# Patient Record
Sex: MALE | Race: WHITE
[De-identification: names, ages, dates, MRNs, and addresses within clinical notes are randomized per-mention and may not be internally consistent; named-entity substitution may affect disease eponyms.]

---

## 2021-10-27 ENCOUNTER — HOSPITAL ENCOUNTER (OUTPATIENT)
Dept: HOSPITAL 53 - M LAB REF | Age: 71
End: 2021-10-27
Attending: OTOLARYNGOLOGY
Payer: MEDICARE

## 2021-10-27 DIAGNOSIS — K13.21: Primary | ICD-10-CM

## 2021-10-28 ENCOUNTER — HOSPITAL ENCOUNTER (OUTPATIENT)
Dept: HOSPITAL 53 - M LAB | Age: 71
End: 2021-10-28
Attending: OTOLARYNGOLOGY
Payer: MEDICARE

## 2021-10-28 DIAGNOSIS — Z01.812: Primary | ICD-10-CM

## 2021-10-28 LAB
BUN SERPL-MCNC: 16 MG/DL (ref 7–18)
CREAT SERPL-MCNC: 1 MG/DL (ref 0.7–1.3)
GFR SERPL CREATININE-BSD FRML MDRD: > 60 ML/MIN/{1.73_M2} (ref 42–?)

## 2021-11-03 ENCOUNTER — HOSPITAL ENCOUNTER (OUTPATIENT)
Dept: HOSPITAL 53 - M RAD | Age: 71
End: 2021-11-03
Attending: OTOLARYNGOLOGY
Payer: MEDICARE

## 2021-11-03 DIAGNOSIS — K13.21: Primary | ICD-10-CM

## 2021-11-03 PROCEDURE — 70491 CT SOFT TISSUE NECK W/DYE: CPT

## 2021-11-03 NOTE — REPVR
PROCEDURE INFORMATION: 

Exam: CT Neck With Contrast 

Exam date and time: 11/3/2021 11:31 AM 

Age: 71 years old 

Clinical indication: Other: Leukoplakia of oral mucosa, including tongue 



TECHNIQUE: 

Imaging protocol: Computed tomography images of the neck with contrast. 

Radiation optimization: All CT scans at this facility use at least one of these 

dose optimization techniques: automated exposure control; mA and/or kV 

adjustment per patient size (includes targeted exams where dose is matched to 

clinical indication); or iterative reconstruction. 

Contrast material: ISOVUE 370; Contrast volume: 75 ml; Contrast route: 

INTRAVENOUS (IV);  



COMPARISON: 

MRI ORBIT FACE NECK W/O FOLL W 2/8/2016 5:57 PM 



FINDINGS: 

Paranasal sinuses: Mild mucosal thickening is present in the maxillary sinuses. 

Nasopharynx: Unremarkable. 

Oropharynx: Unremarkable. No significant tonsillar enlargement. 

Hypopharynx: Unremarkable. 

Larynx: Unremarkable. Normal epiglottis. 

Retropharyngeal space: Unremarkable. 

Submandibular/Parotid glands: Normal. Glands are normal in size. 

Thyroid: Normal. No enlarged or calcified nodules.  

Lymph nodes: Unremarkable. No lymphadenopathy. 



Trachea: Visualized trachea is unremarkable. 

Lungs: Unremarkable as visualized. 

Bones/joints: Moderate/severe degenerative changes of the cervical spine are 

present. There is moderate/severe spinal canal stenosis and severe bilateral 

neural foraminal narrowing at C4-C5, C5-C6, and C6-C7 due to posterior disc 

osteophyte complexes and uncinate spurring. 

Soft tissues: Unremarkable. No significant soft tissue swelling. 



IMPRESSION: 

No acute abnormality. 



Electronically signed by: Romero Alonso On 11/03/2021  12:07:52 PM

## 2021-11-17 ENCOUNTER — HOSPITAL ENCOUNTER (OUTPATIENT)
Dept: HOSPITAL 53 - M LABSMTC | Age: 71
End: 2021-11-17
Attending: ANESTHESIOLOGY
Payer: MEDICARE

## 2021-11-17 DIAGNOSIS — Z11.52: ICD-10-CM

## 2021-11-17 DIAGNOSIS — Z01.818: Primary | ICD-10-CM

## 2021-11-22 ENCOUNTER — HOSPITAL ENCOUNTER (OUTPATIENT)
Dept: HOSPITAL 53 - M OPP | Age: 71
Discharge: HOME | End: 2021-11-22
Attending: INTERNAL MEDICINE
Payer: MEDICARE

## 2021-11-22 VITALS — HEIGHT: 70 IN | BODY MASS INDEX: 29.78 KG/M2 | WEIGHT: 208 LBS

## 2021-11-22 VITALS — DIASTOLIC BLOOD PRESSURE: 67 MMHG | SYSTOLIC BLOOD PRESSURE: 116 MMHG

## 2021-11-22 DIAGNOSIS — E78.5: ICD-10-CM

## 2021-11-22 DIAGNOSIS — Z86.010: ICD-10-CM

## 2021-11-22 DIAGNOSIS — Z12.11: Primary | ICD-10-CM

## 2021-11-22 DIAGNOSIS — Z95.5: ICD-10-CM

## 2021-11-22 DIAGNOSIS — I10: ICD-10-CM

## 2021-11-22 DIAGNOSIS — K64.0: ICD-10-CM

## 2021-11-22 DIAGNOSIS — Z79.82: ICD-10-CM

## 2021-11-22 DIAGNOSIS — Z79.899: ICD-10-CM

## 2021-11-22 DIAGNOSIS — Z88.0: ICD-10-CM

## 2021-11-22 DIAGNOSIS — K57.30: ICD-10-CM

## 2021-11-22 NOTE — CCD
Continuity of Care Document (CCD)

                             Created on: 10/13/2021



Gary Foreman JR

External Reference #: [id number]

: 1950

Sex: Male



Demographics





                          Address                   [address]

[place]  [address]

 

                          Home Phone                [phone]

 

                          Preferred Language        Unknown

 

                          Marital Status            Unknown

 

                          Tenriism Affiliation     Unknown

 

                          Race                      White

 

                          Ethnic Group              Declined to Specify/Unknown





Author





                          Author                    Gary JORDAN MD

 

                          Organization              Unknown

 

                          Address                   [address]

[place]  [address]



 

                          Phone                     [phone]







Care Team Providers





                    Care Team Member Name Role                Phone

 

                    Joce Catalan SHAW   AUTM                [phone]

 

                    Julian Sigala M.D.   AUTM                [phone]







Problems





                    Active Problems     Provider            Date

 

                    Allergic asthma without status asthmaticus BRENDAN Reyes Onset: 

2014

 

                    Basal Cell Carcinoma Skin Lower Limb Including Hip Ravi bradford M.D. Onset: 

2014

 

                    Leukoplakia of oral mucosa Herson Jordan MD     Onset: 2016







Social History





                Type            Date            Description     Comments

 

                Birth Sex                       Unknown          

 

                ETOH Use                        2-3 A Week       

 

                Tobacco Use     Start: Unknown  Patient has never smoked  

 

                Recreational Drug Use                 Denies Drug Use  







Allergies and adverse reactions





             Active Allergies Criticality  Reaction | Severity Comments     Date

 

             Penicillin   Unable to assess criticality hives                    

 2014







Medications





           Active Medications SIG        Qnty       Indications Ordering Provide

r Date

 

                          Advair Diskus                     250-50mcg/Dose Aeros

ol                   1 

puff bid. rinse mouth after using 60units                         Unknown       

  

 

                    Atorvastatin Calcium                     40mg Tablets       

            1 po qd             

                                        Unknown             

 

             Clopidogrel Bisulfate                     75mg Tablets             

                                             

Unknown                                 

 

                    Aspirin                     81mg Tablets                   1

 by mouth every day 

                                        Unknown             

 

                          Proair HFA                     108(90Base) mcg/Act Aer

osol                   2 

puffs four times a day as needed                                 Unknown        

 

 

           Nitroglycerin                                  Unknown    

 

                          Omeprazole                     20mg Capsules DR       

            Take One 

Capsule By Mouth Daily                                 Unknown         

0

 

                          Nystatin                     [id number]/ML Suspension 

                  5ml 

swish in the mouth several minuts before swallowing four times a day for 7 days 

                                        Unknown             







Immunizations





                                        Description

 

                                        No Information Available







Vital Signs





                Date            Vital           Result          Comment

 

                10/13/2021  8:12am Height          70 inches       5'10"

 

                    Weight              220.00 lb            

 

                    BMI (Body Mass Index) 31.6 kg/m2           

 

                    Ideal Body Weight   166 lb               

 

                    Weight              99.792 kg            

 

                    BSA (Body Surface Area) 2.17 m2              

 

                2021  8:21am Height          70 inches       5'10"

 

                    Weight              210.00 lb            

 

                    BMI (Body Mass Index) 30.1 kg/m2           

 

                    Ideal Body Weight   166 lb               

 

                    Weight              95.256 kg            

 

                    BSA (Body Surface Area) 2.13 m2              







Results





                                        Description

 

                                        No Information Available







Procedures





                Date            Code            Description     Status

 

                2021      06983           Office/Outpatient Established 

 MDM 10-19 Min Completed







Medical Devices





                                        Description

 

                                        No Information Available







Encounters





           Type       Date       Location   Provider   Dx         Diagnosis

 

           Office Visit 2021  8:15a [facility] Herson Jordan MD

 K13.21     

Leukoplakia of oral mucosa, including tongue







Assessments





                Date            Code            Description     Provider

 

                10/13/2021      K13.21          Leukoplakia of oral mucosa, incl

uding tongue Herson Jordan MD

 

                2021      K13.21          Leukoplakia of oral mucosa, incl

uding tongue Herson Jordan MD







Plan of Treatment

Future Appointment(s):* 10/27/2021  7:00 am - Herson Jordan MD at [facility]

* 2021  8:00 am - Herson Jordan MD at [facility]

10/13/2021 - Herson Jordan MD* K13.21 Leukoplakia of oral mucosa, including 
  tongue* Follow up:* 2 weeks, 30-minute procedure slow









Functional Status





                                        Description

 

                                        No Information Available







Mental Status





                                        Description

 

                                        No Information Available







Referrals





                                        Description

 

                                        No Information Available

## 2021-11-22 NOTE — ROOR
________________________________________________________________________________

Patient Name: Gary Foreman          Procedure Date: 11/22/2021 10:48 AM

MRN: [id number]                          Account Number: [id number]

YOB: 1950               Age: 71

Room: [facility]                            Gender: Male

Note Status: Finalized                 

________________________________________________________________________________

 

Procedure:            Total Colonoscopy to Cecum + ileoscopy

Indications:          High risk colon cancer surveillance: Personal history of 

                      colonic polyps, Last colonoscopy: 2016

Providers:            West Campbell MD

Referring MD:         Julian Sigala MD

Requesting Provider:  

Medicines:            Monitored Anesthesia Care

Complications:        No immediate complications.

________________________________________________________________________________

Procedure:            Pre-Anesthesia Assessment:

                      - The heart rate, respiratory rate, oxygen saturations, 

                      blood pressure, adequacy of pulmonary ventilation, and 

                      response to care were monitored throughout the procedure.

                      The Colonoscope was introduced through the anus and 

                      advanced to the cecum, identified by appendiceal orifice 

                      and ileocecal valve. The colonoscopy was performed 

                      without difficulty. The patient tolerated the procedure 

                      well. The quality of the bowel preparation was excellent.

                                                                                

Findings:

     The perianal and digital rectal examinations were normal.

     Non-bleeding internal hemorrhoids were found during retroflexion. The 

     hemorrhoids were small and Grade I (internal hemorrhoids that do not 

     prolapse).

     Multiple small and large-mouthed diverticula were found in the 

     recto-sigmoid colon, sigmoid colon and descending colon.

     The exam was otherwise without abnormality on direct and retroflexion 

     views.

     The terminal ileum appeared normal.

                                                                                

Impression:           - Non-bleeding internal hemorrhoids.

                      - Diverticulosis in the recto-sigmoid colon, in the 

                      sigmoid colon and in the descending colon.

                      - The examination was otherwise normal on direct and 

                      retroflexion views.

                      - The examined portion of the ileum was normal.

                      - No specimens collected.

                      - The exam was otherwise normal to the cecum.

Recommendation:       - Patient has a contact number available for 

                      emergencies. The signs and symptoms of potential delayed 

                      complications were discussed with the patient. Return to 

                      normal activities tomorrow. Written discharge 

                      instructions were provided to the patient.

                      - High fiber diet.

                      - Discharge patient to home.

                      - Continue present medications.

                      - Repeat colonoscopy in 5 years for screening purposes.

                      - Return to referring physician.

                      - The findings and recommendations were discussed with 

                      the patient.

                                                                                

Procedure Code(s):    --- Professional ---

                      , Colorectal cancer screening; colonoscopy on 

                      individual at high risk

Diagnosis Code(s):    --- Professional ---

                      Z86.010, Personal history of colonic polyps

                      K64.0, First degree hemorrhoids

                      K57.30, Diverticulosis of large intestine without 

                      perforation or abscess without bleeding

 

CPT copyright 2019 [employer]. All rights reserved.

 

The codes documented in this report are preliminary and upon  review may 

be revised to meet current compliance requirements.

 

West Campbell MD

____________________

West Campbell MD

11/22/2021 11:09:08 AM

Electronically signed by West Campbell MD

Number of Addenda: 0

 

Note Initiated On: 11/22/2021 10:48 AM

Estimated Blood Loss: Estimated blood loss: none.

## 2021-11-22 NOTE — CCD
Continuity of Care Document (CCD)

                             Created on: 2021



Gary Foreman

External Reference #: [id number]

: 1950

Sex: Male



Demographics





                          Address                   [address] [place] [address]  [address]

 

                          Home Phone                [phone]

 

                          Preferred Language        Unknown

 

                          Marital Status            Unknown

 

                          Nondenominational Affiliation     Unknown

 

                          Race                      White

 

                          Ethnic Group              Not  or 





Author





                          Author                    Gary CAMPBELL M.D.

 

                          Organization              Unknown

 

                          Address                   [address] [place]  [address]



 

                          Phone                     [phone]







Care Team Providers





                    Care Team Member Name Role                Phone

 

                    Julian Sigala M.D. AUTM                [phone]







Problems





                    Active Problems     Provider            Date

 

                    History of polyp of colon YOAN Medley Onset: 







Social History





                Type            Date            Description     Comments

 

                Birth Sex                       Unknown          

 

                ETOH Use                        Occasionally     

 

                Tobacco Use     Start: Unknown  Patient has never smoked  







Allergies and adverse reactions





             Active Allergies Criticality  Reaction | Severity Comments     Date

 

             Penicillin   Unable to assess criticality Itch                     

 2013







Medications





           Active Medications SIG        Qnty       Indications Ordering Provide

r Date

 

                                        Suprep Bowel Prep Kit                   

  17.5-3.13-1.6GM/177ML Solution        

             use as directed 354ml                     West Campbell M.D. 

2021

 

                          Lipitor                     40mg Tablets              

     take one tablet by 

mouth every day 90tabs                          Julian Sigala M.D. 2015

 

                          Omeprazole                     20mg Capsules DR       

            1 tabs by 

mouth every day 90caps                          Unknown         

 

                Advair Diskus                     100-50mcg/Dose Aerosol        

                                            

                          Unknown                   

 

           Multiple Vitamin                      Tablets                        

                            Unknown    



 

                          Clopidogrel Bisulfate                     75mg Tablets

                   Take 

One Tablet By Mouth Every Day                                 Unknown         

 

           Aspirin                     81mg Tablets DR                          

                          Unknown    



 

                    Proair HFA                     108(90Base) mcg/Act Aerosol  

                                      

                                        Unknown             

 

           Nitrostat                     0.4mg Tablets Sub                      

                              Unknown    









Immunizations





                                        Description

 

                                        No Information Available







Vital Signs





                Date            Vital           Result          Comment

 

                2021  3:30pm Height          70 inches       5'10"

 

                    Weight              222.00 lb            

 

                    BP Systolic         117 mmHg             

 

                    BP Diastolic        84 mmHg              

 

                    Heart Rate          80 /min              

 

                    BMI (Body Mass Index) 31.9 kg/m2           

 

                    Weight              100.699 kg           

 

                    Body Temperature    97.2 F             

 

                2016 10:10am Height          70 inches       5'10"

 

                    Weight              233.00 lb            

 

                    BP Systolic         111 mmHg             

 

                    BP Diastolic        72 mmHg              

 

                    Heart Rate          70 /min              

 

                    BMI (Body Mass Index) 33.4 kg/m2           

 

                    Weight              105.689 kg           







Results





                                        Description

 

                                        No Information Available







Procedures





                Date            Code            Description     Status

 

                2021      51436           Office/Outpatient New Low MDM 30

-44 Minutes Completed







Medical Devices





                                        Description

 

                                        No Information Available







Encounters





           Type       Date       Location   Provider   Dx         Diagnosis

 

           Office Visit 2021  3:00p Main Office West Campbell M.D. Z

86.010    

Personal history of colonic polyps







Assessments





                Date            Code            Description     Provider

 

                2021      Z86.010         Personal history of colonic poly

ps West Campbell M.D.







Plan of Treatment

Future Appointment(s):* 2021  7:30 am - Bertha at Main Office

* 2021 12:45 pm - West Campbell M.D. at Main Office

2021 - West Campbell M.D.* Z86.010 Personal history of colonic 
  polyps* Comments:* 71 year old white male in no acute distress. His last 
  colonoscopy was in . Tubular adenoma polyp was biopsied. Hemorrhoids and 
  Diverticulosis were noted.He has daily bowel movements. Denies rectal 
  bleeding, abdominal pain, unexplained weight loss or a family history of colon
  cancer or colon polyps.Appetite is healthy. He denies nausea, vomiting, 
  dysphagia or GERD.    Plan:1. Colonoscopy to cecum2. Informed consent.









Functional Status





                                        Description

 

                                        No Information Available







Mental Status





                                        Description

 

                                        No Information Available







Referrals





                                        Description

 

                                        No Information Available

## 2021-11-22 NOTE — CCD
Summarization Of Episode

                             Created on: 2021



GARY CLOUD JR

External Reference #: [id number]

: 1950

Sex: Undifferentiated



Demographics





                          Address                   [address] [address]  [address]

 

                          Home Phone                [phone]

 

                          Preferred Language        Unknown

 

                          Marital Status            Unknown

 

                          Voodoo Affiliation     Unknown

 

                          Race                      Unknown

 

                          Ethnic Group              Not  or 





Author





                          Author                    [facility]

 

                          Organization              [facility]

 

                          Address                   Unknown

 

                          Phone                     Unavailable







Support





                Name            Relationship    Address         Phone

 

                RE              Next Of Kin     Unknown         Unavailable

 

                    WATNFIRE*           Next Of Kin         [address] [place]  [address]                    [phone]

 

                    ISABEL CLOUD  Next Of Kin         [address]  [address]                    [phone]

 

                    Isabel Cloud  ECON                [address]  [address]                    [phone]







Care Team Providers





                    Care Team Member Name Role                Phone

 

                    Cora Weinstein MD Unavailable         Unavailable

 

                    Cora Weinstein MD Unavailable         Unavailable

 

                    Cora Weinstein MD Unavailable         Unavailable

 

                    Cora Weinstein MD Unavailable         Unavailable

 

                    Cora Weinstein MD Unavailable         Unavailable

 

                    Cora Weinstein MD Unavailable         Unavailable

 

                    RE Campbell MD Unavailable         Unavailable

 

                    RE Campbell MD Unavailable         Unavailable

 

                    RE Campbell MD Unavailable         Unavailable

 

                    RE Campbell MD Unavailable         Unavailable

 

                    RE Campbell MD Unavailable         Unavailable

 

                    RE Campbell MD Unavailable         Unavailable

 

                    RE Campbell MD Unavailable         Unavailable

 

                    RE Campbell MD Unavailable         Unavailable

 

                    RE Campbell MD Unavailable         Unavailable

 

                    RE Campbell MD Unavailable         Unavailable

 

                    RE Campbell MD Unavailable         Unavailable

 

                    RE Campbell MD Unavailable         Unavailable

 

                    RE Campbell MD Unavailable         Unavailable

 

                    RE Campbell MD Unavailable         Unavailable

 

                    RE Campbell MD Unavailable         Unavailable

 

                    RE Campbell MD Unavailable         Unavailable

 

                    RE Campbell MD Unavailable         Unavailable

 

                    RE Campbell MD Unavailable         Unavailable

 

                    RE Campbell MD Unavailable         Unavailable

 

                    RE Campbell MD Unavailable         Unavailable

 

                    RE Campbell MD Unavailable         Unavailable

 

                    RE Campbell MD Unavailable         Unavailable

 

                    RE Campbell MD Unavailable         Unavailable

 

                    RE Campbell MD Unavailable         Unavailable

 

                    RE Campbell MD Unavailable         Unavailable

 

                    RE Campbell MD Unavailable         Unavailable

 

                    RE Campbell MD Unavailable         Unavailable

 

                    RE Campbell MD Unavailable         Unavailable

 

                    Adrian S West KING Unavailable         Unavailable

 

                    Adrian S West KING Unavailable         Unavailable

 

                    Adrian S West KING Unavailable         Unavailable

 

                    Adrian S West KING Unavailable         Unavailable

 

                    Adrian S West KING Unavailable         Unavailable

 

                    Adrian S West KING Unavailable         Unavailable

 

                    Adrian S West MD Unavailable         Unavailable

 

                    Adrian S West MD Unavailable         Unavailable

 

                    Adrian S West KING Unavailable         Unavailable

 

                    Adrian S West KING Unavailable         Unavailable

 

                    Adrian S West KING Unavailable         Unavailable

 

                    Adrian S West KING Unavailable         Unavailable

 

                    Adrian S West KING Unavailable         Unavailable

 

                    Adrian S West KING Unavailable         Unavailable

 

                    Adrian S West K[place] Unavailable         Unavailable

 

                    Adrian S West K[place] Unavailable         Unavailable

 

                    RE Campbell MD Unavailable         Unavailable

 

                    RE Campbell MD Unavailable         Unavailable

 

                    RE Campbell MD Unavailable         Unavailable

 

                    RE Campbell MD Unavailable         Unavailable

 

                    RE Campbell MD Unavailable         Unavailable

 

                    RE Campbell MD Unavailable         Unavailable

 

                    RE Campbell MD Unavailable         Unavailable

 

                    LINDA Sigala MD   Unavailable         Unavailable

 

                    LINDA Sigala MD   Unavailable         Unavailable

 

                    LINDA Sigala MD   Unavailable         Unavailable

 

                    LINDA Sigala MD   Unavailable         Unavailable

 

                    LINDA iSgala MD   Unavailable         Unavailable

 

                    LINDA Sigala MD   Unavailable         Unavailable

 

                    LINDA Sigala MD   Unavailable         Unavailable

 

                    LINDA Sigala MD   Unavailable         Unavailable

 

                    LINDA Sigala MD   Unavailable         Unavailable

 

                    LINDA Sigala MD   Unavailable         Unavailable

 

                    LINDA Sigala MD   Unavailable         Unavailable

 

                    LINDA Sigala MD   Unavailable         Unavailable

 

                    LINDA Sigala MD   Unavailable         Unavailable

 

                    LINDA Sigala MD   Unavailable         Unavailable

 

                    LINDA Sigala MD   Unavailable         Unavailable

 

                    LINDA Sigala MD   Unavailable         Unavailable

 

                    LINDA Sigala MD   Unavailable         Unavailable

 

                    LINDA Sigala MD   Unavailable         Unavailable

 

                    LINDA Sigala MD   Unavailable         Unavailable

 

                    LINDA Sigala MD   Unavailable         Unavailable

 

                    LINDA Sigala MD   Unavailable         Unavailable

 

                    LINDA Sigala MD   Unavailable         Unavailable

 

                    LINDA Sigala MD   Unavailable         Unavailable

 

                    LINDA Sigala MD   Unavailable         Unavailable

 

                    LINDA Sigala MD   Unavailable         Unavailable

 

                    LINDA Sigala MD   Unavailable         Unavailable

 

                    LINDA Sigala MD   Unavailable         Unavailable

 

                    LINDA Sigala MD   Unavailable         Unavailable

 

                    LINDA Sigala MD   Unavailable         Unavailable

 

                    LINDA Sigala MD   Unavailable         Unavailable

 

                    LINDA Sigala MD   Unavailable         Unavailable

 

                    LINDA Sigala MD   Unavailable         Unavailable

 

                    LINDA Sigala MD   Unavailable         Unavailable

 

                    LINDA Sigala MD   Unavailable         Unavailable

 

                    LINDA Sigala MD   Unavailable         Unavailable

 

                    LINDA Sigala MD   Unavailable         Unavailable

 

                    LINDA Sigala MD   Unavailable         Unavailable

 

                    LINDA Sigala MD   Unavailable         Unavailable

 

                    LINDA Sigala MD   Unavailable         Unavailable

 

                    LINDA Sigala MD   Unavailable         Unavailable

 

                    LINDA Sigala MD   Unavailable         Unavailable

 

                    LINDA Sigala MD   Unavailable         Unavailable

 

                    LINDA Sigala MD   Unavailable         Unavailable

 

                    LINDA Sigala MD   Unavailable         Unavailable

 

                    LINDA Sigala MD   Unavailable         Unavailable

 

                    LINDA Sigala MD   Unavailable         Unavailable

 

                    LINDA Sigala MD   Unavailable         Unavailable

 

                    LINDA Sigala MD   Unavailable         Unavailable

 

                    LINDA Sigala MD   Unavailable         Unavailable

 

                    LINDA Sigala MD   Unavailable         Unavailable

 

                    LINDA Sigala MD   Unavailable         Unavailable

 

                    Zakiya F Julian KING   Unavailable         Unavailable

 

                    Zakiya F Julian KING   Unavailable         Unavailable

 

                    LINDA Sigala MD   Unavailable         Unavailable

 

                    LINDA Sigala MD   Unavailable         Unavailable

 

                    LINDA Sigala MD   Unavailable         Unavailable

 

                    LINDA Sigala MD   Unavailable         Unavailable

 

                    LINDA Sigala MD   Unavailable         Unavailable

 

                    LINDA Sigala MD   Unavailable         Unavailable

 

                    LINDA Sigala MD   Unavailable         Unavailable

 

                    LINDA Sigala MD   Unavailable         Unavailable

 

                    LINDA Sigala MD   Unavailable         Unavailable

 

                    LINDA Sigala MD   Unavailable         Unavailable

 

                    LINDA Sigala MD   Unavailable         Unavailable

 

                    LINDA Sigala MD   Unavailable         Unavailable

 

                    LINDA Sigala MD   Unavailable         Unavailable

 

                    ILNDA Sigala MD   Unavailable         Unavailable

 

                    LINDA Sigala MD   Unavailable         Unavailable

 

                    LINDA Sigala MD   Unavailable         Unavailable

 

                    LINDA Sigala MD   Unavailable         Unavailable

 

                    LINDA Sigala MD   Unavailable         Unavailable

 

                    LINDA Sigala MD   Unavailable         Unavailable

 

                    LINDA Sigala MD   Unavailable         Unavailable

 

                    LINDA Sigala MD   Unavailable         Unavailable

 

                    LINDA Sigala MD   Unavailable         Unavailable

 

                    LINDA Sigala MD   Unavailable         Unavailable

 

                    LINDA Sigala MD   Unavailable         Unavailable

 

                    MCELHERAN,  MALLIKA PA Unavailable         Unavailable

 

                    MCELHERAN,  MALLIKA PA Unavailable         Unavailable

 

                    MCELHERAN,  MALLIKA PA Unavailable         Unavailable

 

                    MCELHERAN,  MALLIKA PA Unavailable         Unavailable

 

                    MCELHERAN,  MALLIKA PA Unavailable         Unavailable

 

                    MCELHERAN,  MALLIKA PA Unavailable         Unavailable

 

                    MCELHERAN,  MALLIKA PA Unavailable         Unavailable

 

                    MCELHERAN,  MALLIKA PA Unavailable         Unavailable

 

                    MCELHERAN,  MALLIKA PA Unavailable         Unavailable

 

                    MCELHERAN,  MALLIKA PA Unavailable         Unavailable

 

                    MCELHERAN,  MALLIKA PA Unavailable         Unavailable

 

                    MCELHERAN,  MALLIKA PA Unavailable         Unavailable

 

                    MCELHERAN,  MALLIKA PA Unavailable         Unavailable

 

                    MCELHERAN,  MALLIKA PA Unavailable         Unavailable

 

                    MCELHERAN,  MALLIKA PA Unavailable         Unavailable

 

                    MCELHERAN,  MALLIKA PA Unavailable         Unavailable

 

                    MCELHERAN,  MALLIKA PA Unavailable         Unavailable

 

                    MCELHERAN,  MALLIKA PA Unavailable         Unavailable

 

                    MCELHERAN,  MALLIKA PA Unavailable         Unavailable

 

                    MCELHERAN,  MALLIKA PA Unavailable         Unavailable

 

                    MCELHERAN,  MALLIKA PA Unavailable         Unavailable

 

                    MCELHERAN,  MALLIKA PA Unavailable         Unavailable

 

                    MCELHERAN,  MALLIKA PA Unavailable         Unavailable

 

                    MCELHERAN,  MALLIKA PA Unavailable         Unavailable

 

                    MCELHERAN,  MALLIKA PA Unavailable         Unavailable

 

                    MCELHERAN,  MALLIKA PA Unavailable         Unavailable

 

                    MCELHERAN,  MALLIKA PA Unavailable         Unavailable

 

                    MCELHERAN,  MALLIKA PA Unavailable         Unavailable

 

                    MCELHERAN,  MALLIKA PA Unavailable         Unavailable

 

                    Lott, N Vincent NP   Unavailable         Unavailable

 

                    Lott, N Vincent NP   Unavailable         Unavailable

 

                    Lott, N Vincent NP   Unavailable         Unavailable

 

                    Lott, N Vincent NP   Unavailable         Unavailable

 

                    Lott, N Vincent NP   Unavailable         Unavailable

 

                    Colt, N Vincent NP   Unavailable         Unavailable

 

                    Colt, N Vincent NP   Unavailable         Unavailable

 

                    Lott, N Vincent NP   Unavailable         Unavailable

 

                    Colt, N Vincent NP   Unavailable         Unavailable

 

                    Colt, N Vincent NP   Unavailable         Unavailable

 

                    Colt, N Vincent NP   Unavailable         Unavailable

 

                    Lott, N Vincent NP   Unavailable         Unavailable

 

                    Lott, N Vincent NP   Unavailable         Unavailable

 

                    Colt, N Vincent NP   Unavailable         Unavailable

 

                    Lott, N Vincent NP   Unavailable         Unavailable

 

                    Colt, N Vincent NP   Unavailable         Unavailable

 

                    Colt, N Vincent NP   Unavailable         Unavailable

 

                    Lott, N Vincent NP   Unavailable         Unavailable

 

                    Colt, N Vincent NP   Unavailable         Unavailable

 

                    Colt, N Vincent NP   Unavailable         Unavailable

 

                    Lott, N Vincent NP   Unavailable         Unavailable

 

                    Lott, N Vincent NP   Unavailable         Unavailable

 

                    Colt, N Vincent NP   Unavailable         Unavailable

 

                    Lott, N Vincent NP   Unavailable         Unavailable

 

                    Lott, N Vincent NP   Unavailable         Unavailable

 

                    Lott, N Vincent NP   Unavailable         Unavailable

 

                    Colt, N Vincent NP   Unavailable         Unavailable

 

                    Colt, N Vincent NP   Unavailable         Unavailable

 

                    Colt, N Vincent NP   Unavailable         Unavailable

 

                    Lott, N Vincent NP   Unavailable         Unavailable

 

                    Lott, N Vincent NP   Unavailable         Unavailable

 

                    Colt, N Vincent NP   Unavailable         Unavailable

 

                    Lott, N Vincent NP   Unavailable         Unavailable

 

                    Thornton, D Venkata Unavailable         Unavailable

 

                    Thornton, D Venkata Unavailable         Unavailable

 

                    Thornton, D Venkata Unavailable         Unavailable

 

                    Thornton, D Venkata Unavailable         Unavailable

 

                    Thornton, D Venkata Unavailable         Unavailable

 

                    Thornton, D Venkata Unavailable         Unavailable

 

                    Thornton, D Venkata Unavailable         Unavailable

 

                    Thornton, D Venkata Unavailable         Unavailable

 

                    Thornton, D Venkata Unavailable         Unavailable

 

                    Thornton, D Venkata Unavailable         Unavailable

 

                    Thornton, D Venkata Unavailable         Unavailable

 

                    Thornton, D Venkata Unavailable         Unavailable

 

                    Thornton, D Venkata Unavailable         Unavailable

 

                    Thornton, D Venkata Unavailable         Unavailable

 

                    Thornton, D Venkata Unavailable         Unavailable

 

                    Thornton, D Venkata Unavailable         Unavailable

 

                    Thornton, D Venkata Unavailable         Unavailable

 

                    Thornton, D Venkata Unavailable         Unavailable

 

                    Thornton, D Venkata Unavailable         Unavailable

 

                    Thornton, D Venkata Unavailable         Unavailable

 

                    Thornton, D Venkata Unavailable         Unavailable

 

                    Thornton, D Venkata Unavailable         Unavailable

 

                    Thornton, D Venkata Unavailable         Unavailable

 

                    Thornton, D Venkata Unavailable         Unavailable

 

                    Thornton, D Venkata Unavailable         Unavailable

 

                    Thornton, D Venkata Unavailable         Unavailable

 

                    Thornton, D Venkata Unavailable         Unavailable

 

                    Thornton, D Venkata Unavailable         Unavailable

 

                    Thornton, D Venkata Unavailable         Unavailable

 

                    LISBETH JORDAN MD   Unavailable         Unavailable

 

                    LISBETH JORDAN MD   Unavailable         Unavailable

 

                    LISBETH JORDAN MD   Unavailable         Unavailable

 

                    LISBETH JORDAN MD   Unavailable         Unavailable

 

                    LISBETH JORDAN MD   Unavailable         Unavailable

 

                    LISBETH JORDAN MD   Unavailable         Unavailable

 

                    LISBETH JORDAN MD   Unavailable         Unavailable

 

                    LISBETH JORDAN MD   Unavailable         Unavailable

 

                    LISBETH JORDAN MD   Unavailable         Unavailable

 

                    LISBETH JORDAN MD   Unavailable         Unavailable

 

                    LISBETH JORDAN MD   Unavailable         Unavailable

 

                    LISBETH JORDAN MD   Unavailable         Unavailable

 

                    LISBETH JORDAN MD   Unavailable         Unavailable

 

                    LISBETH JORDAN MD   Unavailable         Unavailable

 

                    LISBETH JORDAN MD   Unavailable         Unavailable

 

                    LISBETH JORDAN MD   Unavailable         Unavailable

 

                    LISBETH JORDAN MD   Unavailable         Unavailable

 

                    LISBETH JORDAN MD   Unavailable         Unavailable

 

                    LISBETH JORDAN MD   Unavailable         Unavailable

 

                    LISBETH JORDAN MD   Unavailable         Unavailable

 

                    LISBETH JORDAN MD   Unavailable         Unavailable

 

                    LISBETH JORDAN MD   Unavailable         Unavailable

 

                    LISBETH JORDAN MD   Unavailable         Unavailable

 

                    LISBETH JORDAN MD   Unavailable         Unavailable

 

                    LISBETH JORDAN MD   Unavailable         Unavailable

 

                    LISBETH JORDAN MD   Unavailable         Unavailable

 

                    LISBETH JORDAN MD   Unavailable         Unavailable

 

                    LISBETH JORDAN MD   Unavailable         Unavailable

 

                    LISBETH JORDAN MD   Unavailable         Unavailable

 

                    LISBETH JORDAN MD   Unavailable         Unavailable

 

                    LISBETH JORDAN MD   Unavailable         Unavailable

 

                    LISBETH JORDAN MD   Unavailable         Unavailable

 

                    LISBETH JORDAN MD   Unavailable         Unavailable

 

                    LISBETH JORDAN MD   Unavailable         Unavailable



                                  



Re-disclosure Warning

          The records that you are about to access may contain information from 
federally-assisted alcohol or drug abuse programs. If such information is 
present, then the following federally mandated warning applies: This information
has been disclosed to you from records protected by federal confidentiality 
rules (42 CFR part 2). The federal rules prohibit you from making any further 
disclosure of this information unless further disclosure is expressly permitted 
by the written consent of the person to whom it pertains or as otherwise 
permitted by 42 CFR part 2. A general authorization for the release of medical 
or other information is NOT sufficient for this purpose. The Federal rules 
restrict any use of the information to criminally investigate or prosecute any 
alcohol or drug abuse patient.The records that you are about to access may 
contain highly sensitive health information, the redisclosure of which is 
protected by Article 27-F of the [facility] law. If you 
continue you may have access to information: Regarding HIV / AIDS; Provided by 
facilities licensed or operated by the [facility]; 
or Provided by the [facility] for People With Developmental 
Disabilities. If such information is present, then the following [place] 
mandated warning applies: This information has been disclosed to you from 
confidential records which are protected by state law. State law prohibits you 
from making any further disclosure of this information without the specific 
written consent of the person to whom it pertains, or as otherwise permitted by 
law. Any unauthorized further disclosure in violation of state law may result in
a fine or care home sentence or both. A general authorization for the release of 
medical or other information is NOT sufficient authorization for further disc
losure.                                                                         
    



Allergies and Adverse Reactions

          



           Type       Description Substance  Reaction   Status     Data Source(s

)

 

           Propensity to adverse reactions PENICILLINS Penicillin Rash [facility]



                                                                                
       



Family History

          



             Family Member Name Family Member Gender Family Member Status Date o

f Status 

Description                             Data Source(s)

 

           Unknown    Female     Problem                          [facility] ([facility]

 

           Unknown    Unknown    Problem                          [facility] ([facility]

 

           Unknown    Male       Problem                          [facility] ([facility]

 

                                        () 

 

           Unknown    Female     Problem                          [facility] ([facility]

 

           Unknown    Female                                       



                                                                                
                                               



Encounters

          



           Encounter  Providers  Location   Date       Indications Data Source(s

)

 

                Outpatient      Admitter: Cora Weinstein MDReferrer: Cora Weinstein MD                 

2021 12:00:00 AM EDT Leukoplakia of oral mucosa, including tongue [facility]

 

                                        Leukoplakia of oral mucosa, including to

ngue 

 

                Outpatient      Attender: Vincent LIANG.BENNIE-SJP.BENNIE 

021 12:00:00 AM EDT -

2021 03:29:54 PM EDT                           [facility]

 

                Outpatient      Attender: AR Jordan/[place]/Aram/Reind

l 10/27/2021 07:00:00

AM EDT                                              [facility] ([facility]

 

                Outpatient      Attender: AR Jordan/[place]/Aram/Reind

l 10/13/2021 08:15:00

AM EDT                                              [facility] ([facility]

 

             Outpatient   Attender: West Campbell MD Main Office  2021 

03:00:00 PM EDT 

                                        [facility] ([facility])

 

                Outpatient      Attender: MALLIKA JOSE Physical Therapy 

2021 02:45:00 

PM EDT                                              [facility] ([facility]

 

                Outpatient      Attender: Julian Mckeon  08:30:00 AM EDT

                                                    [facility] ([facility]

)

 

                Outpatient      Attender: AR Jordan/Lou/Aram/Reind

l 2021 08:15:00

AM EDT                                              [facility] ([facility]

 

             Outpatient   Attender: Venkata Truong [id number]  2021 

12:00:00 AM EST 

Unilateral primary osteoarthritis, right knee [facility]

 

                                        Unilateral primary osteoarthritis, right

 knee 

 

             Outpatient   Referrer: Venkata Truong              2021 

12:00:00 AM EST Pain in 

right knee                              [facility]

 

                                        Pain in right knee 

 

                Outpatient      Attender: Julian Mckeon  07:00:00 AM EST

                                                    [facility] ([facility]

)

 

                Outpatient      Attender: Vicnent VALDEZ-SJP.BENNIE 10/13/2

020 12:00:00 AM EDT -

10/13/2020 12:25:47 PM EDT                           [facility]



                                                                                
                                                                                
                                    



Immunizations

          



             Vaccine      Date         Status       Description  Data Source(s)

 

             COVID-19 VACCINE Moderna 10/26/2021 12:00:00 AM EDT completed      

           [facility]

 

                                        Vaccine Series Complete: YESThis Data wa

s Submitted to [facility]. 

 

             COVID-19 VACCINE Moderna 2021 12:00:00 AM EST completed      

           [facility]

 

                                        Vaccine Series Complete: YESThis Data wa

s Submitted to [facility] [employer]. 

 

             COVID-19 VACCINE Moderna 2020 12:00:00 AM EST completed      

           [facility]

 

                                        Vaccine Series Complete: NOThis Data was

 Submitted to [facility] [employer]. 

 

                                        This CVX code allows reporting of a vacc

ination when formulation is unknown (for

example, when recording a Influenza vaccination when noted on a vaccination 
card)           10/05/2020 08:30:00 AM EDT completed                       [facility] ([facility])

 

                          INFLUENZA VACCINE QUADRIVALENT  (65 YR UP)/MF59

C.1/PF 10/05/2020 12:00:00

AM EDT              completed                               [facility]



                                                                                
                                               



Medications

          



          Medication Brand Name Start Date Product Form Dose      Route     Admi

nistrative 

Instructions Pharmacy Instructions Status     Indications Reaction   Description

 Data 

Source(s)

 

        240 mcg/0.7 mL         10/26/2021 12:00:00 AM EDT syringe 0             

  INJECT AS DIRECTED 

INJECT AS DIRECTED SOLD: 10/26/2021                                        [facility]

 

          100 mcg/0.5 mL           10/26/2021 12:00:00 AM EDT suspension 0      

             INJECT AS DIRECTED 

PER STANDING ORDER [3RD DOSE] INJECT AS DIRECTED PER STANDING ORDER [3RD DOSE] 

SOLD: 10/26/2021                                                 [facility]

 

          100,000 unit/mL           10/12/2021 12:00:00 AM EDT suspension 300   

              SWISH WITH 5ML FOR

2 MINUTES THEN SPIT OUT FOUR TIMES A DAY SWISH WITH 5ML FOR 2 MINUTES THEN SPIT 

OUT FOUR TIMES A DAY SOLD: 10/12/2021                                        [facility]

 

          100,000 unit/mL           10/12/2021 12:00:00 AM EDT suspension 300   

              SWISH WITH 5ML FOR

2 MINUTES THEN SPIT OUT FOUR TIMES A DAY SWISH WITH 5ML FOR 2 MINUTES THEN SPIT 

OUT FOUR TIMES A DAY SOLD: 10/25/2021                                        [facility]

 

                    SUPREP BOWEL PREP KIT 17.5-3.13-1.6 gram SODIUM, POTASSIUM,M

AG SULFATES 

2021 12:00:00 AM EDT recon soln 354                   USE AS DIRECTED USE 

AS DIRECTED 

SOLD: 10/10/2021                                                 [facility]

 

        Suprep Bowel Prep Kit Suprep Bowel Prep Kit 2021 12:00:00 AM EDT  

                                

             active                                              [facility]

 

        4 mg            2021 12:00:00 AM EDT tablets,dose pack 21         

     AS DIRECTED WITH FOOD 

AS DIRECTED WITH FOOD SOLD: 2021                                        [facility]

[facility]

 

                Methylprednisolone 4 MG Oral Tablet [Medrol] Medrol          2021 12:00:00 AM EDT  

                                        active                          [facility] (

[facility])

 

                                        Omeprazole 20 MG Delayed Release Oral Ca

psule Omeprazole 20 MG Oral Capsule 

Delayed Release (PriLOSEC)              Omeprazole 20 MG Oral Capsule Delayed Re

lease 

(PriLOSEC) 2021 12:00:00 AM EST        20 mg  Oral                 active 

              Take 20 mg by 

mouth daily                             [facility]

 

                          atorvastatin 40 MG Oral Tablet Atorvastatin Calcium 40

 MG Oral Tablet (LIPITOR) 

Atorvastatin Calcium 40 MG Oral Tablet (LIPITOR) 2021 12:00:00 AM EST     

                40

 mg     Oral                    active                  Take 40 mg by mouth [facility]

 

                          clopidogrel 75 MG Oral Tablet Clopidogrel Bisulfate 75

 MG Oral Tablet (PLAVIX) 

Clopidogrel Bisulfate 75 MG Oral Tablet (PLAVIX) 2021 12:00:00 AM EST     

                75

 mg     Oral                    active                  Take 75 mg by mouth [facility]

 

                                        60 ACTUAT Fluticasone propionate 0.25 MG

/ACTUAT / salmeterol 0.05 MG/ACTUAT Dry 

Powder Inhaler Fluticasone-Salmeterol 250-50 MCG/DOSE Inhalation Aerosol Powder 
Breath Activated (ADVAIR)               Fluticasone-Salmeterol 250-50 MCG/DOSE I

nhalation 

Aerosol Powder Breath Activated (ADVAIR) 10/05/2020 12:00:00 AM EDT             

                                      

active                                          INHALE ONE PUFF BY MOUTH TWO DIDIER

ES A DAY [facility]

 

                                        Nitroglycerin 0.4 MG Sublingual Tablet n

itroglycerin (NITROSTAT) 0.4 MG SL 

tablet  nitroglycerin (NITROSTAT) 0.4 MG SL tablet                 0.4 mg  Subli

ngual                 

aborted                                                     Place 0.4 mg under t

he tongue every 5 (five) minutes as needed for 

chest pain                              [facility]



                                                                                
                                                                                
                            



Insurance Providers

          



             Payer name   Policy type / Coverage type Policy ID    Covered party

 ID Covered 

party's relationship to davila Policy Davila             Plan Information

 

          [employer]               [id number]           Estrella                 [id number]

 

          [employer]               [id number]           SP                  [id number]

 

          [employer] (pr) Medigap Part B           [id number]     Self                 

 

                Medicare [employer] Gov[employer]ic Medicare Primary [id number]     

[id number] Self                              

      [id number]

 

                Medicare Natl Govt [employer]ic Medicare Primary [id number]      

[id number] Self                                    0

[id number]

 

          MEDICARE            [id number]  [id number]                     [id number]

 

          MEDICARE            [id number]           SP                  [id number]

A

 

                Medicare Natl Govt Servic Medicare Primary [id number]     

[id number] Self                              

      [id number]

 

          MEDICARE            [id number]           SP                  [id number]

[id number]

 

                Medicare Natl Govt Servic Medicare Primary [id number]      

[id number] Self                                    0

[id number]

 

          Medicare Natl Govt Servic Medicare Primary           [id number]     Self   

              

 

          MEDICARE            [id number]           [place]rella                 [id number]

A

 

          [place]are [place]           [id number]    Self            

     

 

          MEDICARE  A         [id number]           Self                [id number]

[id number]

 

          MEDICARE            [id number]           [place]rella                 [id number]

[id number]

 

          UMR       U         [id number]            Self                [id number]

 

          [employer]     Medigap Part B [id number]

14.0 Self                

[id number]

 

          UMR                 [id number]  [id number]                      [id number]

 

          UMR                 [id number]            Estrella                 [id number]

 

          [employer]     Medigap Part B [id number]

14.0 Self                

[id number]

 

                r (New [employer]) Medigap Part B  [id number]        

[id number] Self                              

      [id number]

 

          Umr       Commercial [id number]  [id number]

 Self                [id number]

 

          Umr       Commercial [id number]  [id number]

 Self                [id number]

 

          [employer]     Medigap Part B [id number]

32.0 Self                

[id number]

 

                Medicare [place]/[employer] Medicare Primary [id number]      

[id number] Self                                    

[id number]

 

           [employer] Ppo  Medigap Part B [id number]  [id number].

5124.0 Self        

                                        [id number]

 

          [employer]     Medigap Part B [id number]

32.0 Self                

[id number]

 

                Medicare [place]/[employer] Medicare Primary [id number]      

[id number] Self                                    

[id number]

 

          [employer]     Medigap Part B [id number]       [id number]     Self                333

 

          Medicare [place]/NGS Medicare Primary           [id number]     Self        

         

 

          [employer] Ppo Medigap Part B [id number]       [id number]      Self                333

 

          [employer]     Commercial           [id number]     Self                 

 

          Medicare [place] Medicare Primary           [id number]     Self            

     

 

          MEDICARE           [id number]           S                   [id number]

A

 

          [employer] PPO O         [id number]           S                   [id number]

 

          [employer] PPO P         [id number]                 S                   [id number]

 

          [employer]               [id number]           SP                  [id number]

 

          R [facility]           [id number]            SP               

   [id number]

 

                              [id number]                               [id number]

 

          [facility]           [id number]            SP               

   [id number]

 

                [employer]/Um[employer] (Old) Medigap Part B  [id number]       

[id number] Self                              

      [id number]

 

             Medicare     Medicare Primary [id number]  [id number].

[id number] 

Self                                                [id number]

 

          Umr       Commercial [id number]  [id number] 

Self                [id number]

 

             Medicare     Medicare Primary [id number]  [id number].

[id number] 

Self                                                [id number]

 

                Medicare [place]/[employer]GS Medicare Primary [id number]     

[id number] Self                                    

[id number]

 

             Medicare     Medicare Primary [id number]   [id number].

[id number] Self

                                                    [id number]

 

                Medicare [place]/[employer]GS Medicare Primary [id number]      

[id number] Self                                    

[id number]

 

             Medicare     Medicare Primary [id number]   [id number].

99.936.32541.0 Self

                                                    [id number]

 

             [employer] (Old) Medigap Part B [id number]    [id number]

.[id number] 

Self                                                [id number]

 

             Medicare     Medicare Primary [id number]   [id number].

99.936.93106.0 Self

                                                    [id number]

 

          [employer]     Medigap Part B [id number]

0.0 Self                

[id number]

 

          [employer]     Medigap Part B [id number]

32.0 Self                

[id number]

 

                Medicare [place]/[facility] Medicare Primary [id number]      

[id number] Self                                    

[id number]

 

             Medicare Medicare Primary [id number]   [id number].

[id number] 

Self                                                [id number]

 

          [facility] Part B [id number]

32.0 Self                

[id number]

 

                Medicare [facility] Medicare Primary [id number]      

[id number] Self                                    

[id number]

 

             Medicare Medicare Primary [id number]   [id number].

[id number] Self

                                                    [id number]

 

          [facility] Part B [id number]

32.0 Self                

[id number]

 

                Medicare [place]/[facility] Medicare Primary [id number]      

[id number] Self                                    

[id number]

 

             Medicare Medicare Primary [id number]   [id number].

[id number] 

Self                                                [id number]

 

             Medicare     Medicare Primary [id number]   [id number].

[id number] Self

                                                    [id number]



                                                                                
                                                                                
                                                                                
                                                                                
                                                                                
                                                                                
                                                                                
                                                                                
                                                



Problems, Conditions, and Diagnoses

          



           Code       Display Name Description Problem Type Effective Dates Data

 Source(s)

 

                    K13.21              Leukoplakia of oral mucosa, including to

ngue Leukoplakia of oral mucosa, 

including tongue    Diagnosis           2021 09:27:00 AM EDT [facility]

 

                    Z01.810             Encounter for preprocedural cardiovascul

ar examination Encounter for 

preprocedural cardiovascul Diagnosis           2021 02:59:26 PM EDT [facility]

 

             E78.5        Hyperlipidemia, unspecified Hyperlipidemia, unspecifie

d Diagnosis    

2021 02:59:26 PM EDT              [facility]

 

             Z98.61       Coronary angioplasty status Coronary angioplasty statu

s Diagnosis    

2021 02:59:26 PM EDT              [facility]

 

             M25.561      Pain in right knee Pain in right knee Diagnosis     10:30:00 AM 

[facility]

 

                    M17.11              Unilateral primary osteoarthritis, right

 knee Unilateral primary 

osteoarthritis, right knee Diagnosis           2021 10:25:59 AM [facility]

 

             Z01.810      Preop cardiovascular exam Preop cardiovascular exam 64

[id number]     10/30/2021 

12:00:00 AM EDT                         [facility]



                                                                                
                                                                              



Surgeries/Procedures

          



             Procedure    Description  Date         Indications  Data Source(s)

 

                          ECG ROUTINE ECG W/LEAST 12 LDS W/I&R <td>POCT AMB 

EKG</td><td>Routine</td><td>2021  3:32 PM EDT</td><td>



Coronary artery disease, s/p PTCA



Preop cardiovascular exam</td><td>



Results for this procedure are in the results section.</td> 2021 03:32:00 

PM EDT                    Preop cardiovascular examCoronary artery disease, s/p 

PTCA [facility]

 

                                        Preop cardiovascular exam 

 

                                        Coronary artery disease, s/p PTCA 

 

             BIOPSY TONGUE ANTERIOR TWO-THIRDS              10/27/2021 12:00:00 

AM EDT              [facility] 

([facility] )

 

             OFFICE OUTPATIENT VISIT 25 MINUTES              10/27/2021 12:00:00

 AM EDT              [facility] 

([facility], )

 

             OFFICE OUTPATIENT VISIT 25 MINUTES              10/13/2021 12:00:00

 AM EDT              [facility] 

([facility] )

 

             OFFICE OUTPATIENT NEW 30 MINUTES              2021 12:00:00 A

M EDT              [facility] 

([facility])

 

             RADEX SHOULDER COMPLETE MINIMUM 2 VIEWS              2021 12:

00:00 AM EDT              [facility] 

([facility] )

 

             RADEX ANKLE COMPLETE MINIMUM 3 VIEWS              2021 12:00:

00 AM EDT              [facility] 

([facility])

 

             OFFICE OUTPATIENT NEW 45 MINUTES              2021 12:00:00 A

M EDT              [facility] ([facility])

 

             OFFICE OUTPATIENT VISIT 25 MINUTES              2021 12:00:00

 AM EDT              [facility] 

([facility])

 

             OFFICE OUTPATIENT VISIT 10 MINUTES              2021 12:00:00

 AM EDT              [facility] 

([facility])

 

             OFFICE OUTPATIENT VISIT 25 MINUTES              2020 12:00:00

 AM EST              [facility] 

([facility])



                                                                                
                                                                                
                            



Results

          



                    ID                  Date                Data Source

 

                    [id number]              2021 08:01:00 AM [facility]









          Name      Value     Range     Interpretation Code Description Data Stefani

rce(s) Supporting 

Document(s)

 

           Neck CT W contrast IV Laboratory test result                         

         [facility] ([facility])                            









                    ID                  Date                Data Source

 

                    [id number]           2021 10:47:00 AM EDT [facility]

 

                                        Surgical Pathology ReportName: TATIANA CHOWN: [id number] Number: [id number]

[id number] Date: 2021 00:00Received Date: 2021 09:42Physician(s): 
COAR WEINSTEIN MD HAGHIR, SHAHANDEH F,VITApecimen(s) ReceivedA: Material 
received for consultation, [place], [place] -[address], 
GDLRClinical HistoryHistory of leukoplakia and mild atypia/dysplasia at same 
site in 2018. Consultation to rule out severe dysplasia.DiagnosisLEFT LATERAL 
ORAL TONGUE LESION, BIOPSY ([id number], 10/27/21): KERATOSISWITH MODERATE SQUAMOUS
 DYSPLASIA. (SEE MICROSCOPIC DESCRIPTION).Electronically Signed By Moe Sparrow M.D., Attending Qhtuoifqnaa05/3/2021 10:47:37   Gross DescriptionReceived
 [facility] [place] [place] are 2 H and Estained slides and 
one paraffin block labeled -[id number] with thecorresponding pathology 
report./jrsMicroscopic DescriptionSections show hyperplastic squamous mucosa 
with surface parakeratosis andmarked cytologic atypia and mitotic figures 
involving the basal layerassociated with loss of cellular polarity and 
dyskeratosis. There is alsoa superficial lymphoplasmacytic infiltrate.  I do not
 see evidence ofsevere dysplasia or squamous carcinoma in situ.  Thank you for 
letting mesee this case in consultation.This report may include one or more 
immunohistochemical stain results thatuse analyte specific reagents. All 
positive and negative controls havebeen reviewed by the attending pathologist 
and are satisfactory. The testswere developed and their performance 
characteristics determined by [facility] Pathology department. They have not been c
leared or approved by the USFood and Drug Administration. The FDA has determined
 that such clearanceor approval is not necessary. 









          Name      Value     Range     Interpretation Code Description Data Stefani

rce(s) Supporting 

Document(s)

 

                                                                       









                    ID                  Date                Data Source

 

                    [id number]         [facility]21 10[facility]26[facility]









          Name      Value     Range     Interpretation Code Description Data [facility](s) Supporting 

Document(s)

 

             Creatinine For GFR 1.00 mg/dL   0.70-1.30    Normal (applies to non

-numeric results) 

                          [facility] ([facility], )  

 

                Glomerular Filtration Rate Laboratory test result               

  Normal (applies to non-

numeric results)                        [facility] ([facility], ) 

 

 

                                        <content>Units are mL/min/1.73 

m2</content><br/><content></content><br/><content>Chronic Kidney Disease Staging
 per NKF:</content><br/><content></content><br/><content>Stage I & II   GFR >=60
       Normal to Mildly Decreased</content><br/><content>Stage III      GFR 30-
59      Moderately Decreased</content><br/><content>Stage IV       GFR 15-29    
  Severely Decreased</content><br/><content>Stage V        GFR <15        Very 
Little GFR Left</content><br/><content>ESRD           GFR <15 on 
RRT</content><br/><content></content> 









                    ID                  Date                Data Source

 

                    [id number]         10/28/2021 10:26:00 [facility]









          Name      Value     Range     Interpretation Code Description Data Stefani

rce(s) Supporting 

Document(s)

 

                Urea nitrogen [Mass/volume] in Serum or Plasma 16 mg/dL        7

-18            Normal (applies to 

non-numeric results)                     [facility] ([facility])

  

 

                                        <content>note:<nlbl:demographic_changed>

</content><br/><content></content> 









                    ID                  Date                Data Source

 

                    [id number]         10[facility]27/2021 07:30:00 AM [facility]









          Name      Value     Range     Interpretation Code Description Data Stefani

rce(s) Supporting 

Document(s)

 

           Surgical pathology study Laboratory test result                      

            [facility] ([facility])                            

 

                                        FINAL DIAGNOSIS



Left lateral oral tongue, lesion, biopsy:

Hyperkeratotic squamous mucosa with moderate squamous atypia.

The case was also sent to [facility] for consultation, see report

[id number] scanned in EMR under pathology module.



                                        2021



CLINICAL DIAGNOSIS



Lesion

                                        10/28/2021 - 1054



GROSS DIAGNOSIS



Received in formalin labeled "Gary Cloud, designated left lateral

oral tongue" is a 0.4 cm punch of oral mucosa.  All in one.

                                        -

                                        10/28/2021 - 1054



PRELIMINARY DIAGNOSIS



                                        .

                                        2021



Signed________ Cora Weinstein MD 10/29/2021 1245 (Prelim)

Signed________ Cora Weinstein MD 2021 1536

 









                    ID                  Date                Data Source

 

                    [id number]          2021 09:50:00 AM EDT [facility]









          Name      Value     Range     Interpretation Code Description Data Stefani

rce(s) Supporting 

Document(s)

 

           Prostate specific Ag [Mass/volume] in Serum or Plasma 0.97 ng/mL     

                             [facility] 

[facility]                   

 

                                        This assay was performed on the Siemens 

Dimension EXL using the B-

Galactosidase/CPRG methodology and should not be compared interchangeably with 
other methods.  The PSA should not be used alone as a screening test for the 
presence or absence of malignant disease.

 









                    ID                  Date                Data Source

 

                    [id number]          2021 10:46:00 AM EDT [facility]









          Name      Value     Range     Interpretation Code Description Data Stefani

rce(s) Supporting 

Document(s)

 

           Urate [Mass/volume] in Serum or Plasma 6.9 mg/dL  3.5-7.2            

              [facility] ([facility]                              









                    ID                  Date                Data Source

 

                    [id number]          2021 10:45:00 AM [facility]T [facility]









          Name      Value     Range     Interpretation Code Description Data Stefani

rce(s) Supporting 

Document(s)

 

                          Thyrotropin [Units/volume] in Serum or Plasma by Detec

tion limit <= 0.05 mIU/L 

3.07 uIU/mL  0.36-3.74                              [facility] ([facility]

)  









                    ID                  Date                Data Source

 

                    [id number]          2021 10:45:00 AM EDT [facility] ([facility])









          Name      Value     Range     Interpretation Code Description Data Stefani

rce(s) Supporting 

Document(s)

 

           Cholesterol in HDL [Mass/volume] in Serum or Plasma 50 mg/dL   35-60 

                           [facility] 

([facility])                   

 

           Cholesterol [Mass/volume] in Serum or Plasma 123 mg/dL  131-200      

                    [facility] 

([facility])                   

 

           Triglyceride [Mass/volume] in Serum or Plasma 88 mg/dL         

                     [facility] 

([facility])                   

 

                    Cholesterol in LDL [Mass/volume] in Serum or Plasma by calcu

lation 55 CALC             

                                          [facility] ([facility])  









                    ID                  Date                Data Source

 

                    [id number]          2021 10:45:00 AM EDT [facility]









          Name      Value     Range     Interpretation Code Description Data Stefani

rce(s) Supporting 

Document(s)

 

           Glucose [Mass/volume] in Serum or Plasma 94 mg/dL   74-99            

                [facility]                              

 

                                        100-125 mg/dL     PRE-DIABETES/FASTING

>126 mg/dL          DIABETES/FASTING

 

 

          Creatinine 1.0 mg/dL 0.6-1.3                       [facility]  

 

           Urea nitrogen [Mass/volume] in Serum or Plasma 15 mg/dL   7-18       

                      [facility] 

([facility])                   

 

           Potassium [Moles/volume] in Serum or Plasma 4.3 meq/L  3.5-5.1       

                   [facility] 

([facility])                   

 

           Sodium [Moles/volume] in Serum or Plasma 143 meq/L  136-145          

                [facility]                             

 

           Chloride [Moles/volume] in Serum or Plasma 106 meq/L           

                  [facility] 

([facility])                   

 

           Calcium [Mass/volume] in Serum or Plasma 9.5 mg/dL  8.5-10.1         

                [facility] 

([facility])                   

 

           Carbon dioxide, total [Moles/volume] in Serum or Plasma 25 meq/L   21

-32                            

[facility] ([facility])            

 

                          Aspartate aminotransferase [Enzymatic activity/volume]

 in Serum or Plasma 22 U/L

             15-37                                  [facility] ([facility]

)  

 

                    Alkaline phosphatase isoenzyme [Units/volume] in Serum or Pl

asma 83 mg/dL            

                                                [facility] ([facility])  

 

          Total Bilirubin 0.5 mg/dL 0.2-1.0                       [facility] ([facility])  

 

           Proteinase 3 Ab [Units/volume] in Serum 7.2 g/dL   6.4-8.2           

               [facility]                              

 

                    Alanine aminotransferase [Enzymatic activity/volume] in Seru

m or Plasma 30 U/L              

12-78                                           [facility] ([facility])  

 

           Albumin [Mass/volume] in Serum or Plasma 4.3 g/dL   3.4-5.0          

                [facility]                              

 

          A/G Ratio 1.48 CALC 1.00-1.90                     [facility]  

 

                                        Glomerular filtration rate/1.73 sq M pre

dicted among non-blacks [Volume 

Rate/Area] in Serum or Plasma by Creatinine-based formula (MDRD) Laboratory test

result                                              [facility]

)  

 

                                        Glomerular filtration rate/1.73 sq M pre

dicted among blacks [Volume Rate/Area] 

in Serum or Plasma by Creatinine-based formula (MDRD) Laboratory test result    

                  

                                        [facility] ([facility])  

 

                                        <content>CHRONIC KIDNEY DISEASE STAGING 

PER 

NKF</content><br/><content></content><br/><content>STAGE I & II      GFR >= 60  
     NORMAL TO MILDLY DECREASED</content><br/><content>STAGE III          GFR 
30-59          MODERATELY DECREASED</content><br/><content>STAGE IV           
GFR 15-29         SEVERELY DECREASED</content><br/><content>STAGE V            
GFR <15            VERY LITTLE GFR LEFT</content><br/><content>ESRD             
   GFR <15            ON RRT</content><br/><content></content> 









                    ID                  Date                Data Source

 

                    [id number]          2021 10:45:00 AM EDT [facility] ([facility]









          Name      Value     Range     Interpretation Code Description Data Stefani

rce(s) Supporting 

Document(s)

 

                          Glucose mean value [Mass/volume] in Blood Estimated fr

om glycated hemoglobin 125

 mg/dL                                        [facility]

)  

 

           Hemoglobin A1c/Hemoglobin.total in Blood 6.0 %                       

                [facility] ([facility])                              

 

                                        Lab Result Notes:

Pre-Diabetes   5.7 - 6.4 %

Diabetes           = or > 6.5%

 









                    ID                  Date                Data Source

 

                    [id number]          2021 10:45:00 AM EDT [facility] ([facility])









          Name      Value     Range     Interpretation Code Description Data Stefani

rce(s) Supporting 

Document(s)

 

           Leukocytes [#/volume] in Blood by Automated count 8.7 x10*3/UL 4.1-10

.9                         

[facility]            

 

           Hematocrit [Volume Fraction] of Blood by Automated count 43.2 %     3

7.0-51.0                        

[facility]            

 

           Hemoglobin [Mass/volume] in Blood 14.5 g/dL  12.0-18.0               

         [facility]                              

 

           Erythrocytes [#/volume] in Blood by Automated count 5.33 x10*6/UL 4.2

0-6.30                        

[facility] ([facility])            

 

          MCV       81.0 fL   80.0-97.0                     [facility]  

 

          MCH       27.3 pg   26.0-32.0                     [facility] ([facility]

[facility])  

 

           Platelets [#/volume] in Blood by Automated count 229 x10*3/-440

                          [facility]

 ([facility]                  

 

           Erythrocyte distribution width [Ratio] by Automated count 13.2 %     

11.6-13.7                        

[facility] ([facility])            

 

          MCHC      33.7 g/dL 31.0-38.0                     [facility]  

 

          Lymph %   26.8 %    10.0-58.5                     [facility]  

 

          MPV       8.7 FL    7.8-11.0                      [facility]  

 

          Mid %     6.9 %     1.7-9.3                       [facility]  

 

          Lymph #   2.3 x10*3/UL 0.6-4.1                       [facility]  

 

          Neut %    66.3 %    37.0-92.0                     [facility]  

 

          Mid #     0.7 x10*3/UL 0.1-0.6                       [facility]  

 

          Neut #    5.7 x10*3/UL 2.0-7.8                       [facility])  









                    ID                  Date                Data Source

 

                    [id number]          2021 10:45:00 AM EDT [facility] ([facility])









          Name      Value     Range     Interpretation Code Description Data Stefani

rce(s) Supporting 

Document(s)

 

           Hemoglobin A1c/Hemoglobin.total in Blood Laboratory test result      

                            [facility] 

([facility])                   









                    ID                  Date                Data Source

 

                    [id number]           2021 05:06:11 PM [facility]

 

                                        XR KNEE 4 OR MORE VIEWS 38734YOPMG RESUL

TInterpreted by:Venkata Truong MDTechnique: Weightbearing AP, Russell, lateral, nonweightbearing merchant 
view of the right kneeComparison: NoneFindings: Moderate medial and lateral 
joint space narrowing with some medial osteophyte formation.  Some 
chondrocalcinosis.  Moderate patellofemoral degenerative change.Patellar 
alignment is normal. There is no effusion.No fracture or dislocation is 
identified. No other soft tissue or bony lesions identified. Impression:1.  
Moderate tricompartmental osteoarthritis with chondrocalcinosisThis document has
 been electronically signed by  Venkata Truong MD on 2021  5:06 PM 









          Name      Value     Range     Interpretation Code Description Data Stefani

rce(s) Supporting 

Document(s)

 

                                                                       









                    ID                  Date                Data Source

 

                    [id number]           2021 12:27:42 PM [facility]









          Name      Value     Range     Interpretation Code Description Data Stefani

rce(s) Supporting 

Document(s)

 

          Progress Note                                         [facility] 

OMHPPy6tEdIMZnPx94/IJExzNWUcw9CwJJxaBTh4YJneXYEpS2CjWMD1vF9pCLV9XAtMYmPmXmPmRGZ7

lbm
FxFmeUKdLcUHHcGbuCMrLnAPdrCzurbJEcVD6HnZY5DGMrT40dCMEfZBXiY7RbLFV7Ghh+Gs9OMJJdkA

LdQW0WIrgE0Q7wrjrD6YuN/hxQNqETHCq5d/iAedUWeLMCLaligBWP4g9IhAERSDCGOg6NK9471Yeagc

N09n3g4tngQ0Ww9s3xdbcmX5Vi/verOkiiIAhU+f/+
1yQzanyt/voXtfnMmvvPNr+it4EPI8QVXwslvhF18iKQI7F5RxMMxRchqqa9laS7BE+yIFZnkydffKLO

wfgslHNR9JzKhEEfL+uqFcV16/y331vPRjCshhszD1CMIRtCMVxbE/dsSoHX2PhlQExIlCU3G5bQqNfn

FgjopydgfC/pag1cWw1ucu8JUKiMDeLAcElBKYDfFP
0qiMlEK0V50aK19XRZ8XMl8qnNKQpc6iwuaP3nba8o+sQzto4vfwZU94OW5YcDcx/qp5fnOlP/lo3KZU

FB+bRA+qxQ/EWqHskSF5ObXdXCv16ZNrOtixDVGptRUN31s6oNJ6fJzwwU5+j6bh83DG1amljpwH/OmC

sTNsf9oxvwSZzNvswCDBkC1uKFBhOUtfW2rk2MVpQI
2D+WRg4Zx8ufpEnsL4t7B04pquVla83MLr3F/5YTnjmCN0E0/ldFl5ZodD1OetMHF+rMO/wyfaCQtI9b

CIGLjpsmLrwUlFVU4RfxvoqptUHrMi8uxm6pdtb5ito2RW1s/110Yx4rpDdPWrBUHbPu7Q3OQhYRf+tm

h60DWKzXsCg/BRUwFzYakJkzDfFsOhEZSeSIHX4vk1
XwHlCfWrrG8zYZ3zzTrxuCbKgU4kTBV8hj9WqG6dP2cpdGQLACzenH4KGkcGjdgx7x4v7oJyy9AcpOSR

jTReJGpigK2wQY5UxFvoCteoPlN/Iu0qHSkL5k9j+xVmDXcZMjuNlc5CDgF8Az9ToIVRYySQM+ZVRYsL

+rs6n/AJbngDImV2D2oLItizPfnSat+OVj+Q3gnftF
xdvpJF/0YvVf4x03Sq+Nlnfqufp+mU/y+aqf0hChdhwHzLZZsfkhd+x4YI5fHtLIJfPm4PW9YrJ2/ubj

MDjyfXezWpci+Xv7wzam7UXiHTsxziqv0FNfva6YrEfdfqdPWr/JmRYUaa/BWyeFHAAwrBw2SI68GJRe

MoWM+vZH+F5ccELZmTA/mmWdnQ5eEz6rLjYfVwyuxA
Ky8PFWZB67jiWnr5fmRINlC0MAJW1CS6Powk2U1bsVRlIqLHYTNwiIVwOBdli5XU1mLEvpc5GcdDLF2d

XNUNvMFP8esfUMr8KlFl97tUUgvy5Mg7Ai8vq00UfuI1iFvCxmOuLyQfNskUGeCNL4DarUS8kn5tpgsX

rA1YFzz//8MwjOzb/+q86+3v93NgVwFZNx3M8d1mlq
BcaMPCAEn0fRnCwvzNmy3gW+xjOnwU2KMCNSj9KdZGGA/Ta6eG6Yrq47YG18xTcUL6+AD8hpyz0GRPSg

g1WhsZ81A2duaIqEvFlFv2OeynAdKhqvBIvvc3dbjTZlmjfcKesw97AhF4cf5Vz+y9p6OV7hrR06uj28

PT5k59iDVRlgz426STNrUfz5YmB1jC7nC5/dtFzlKc
lLPi8briDiXmhTA/vVAvUbdERGofIyV7Msh3sSvPYRhZOuD3vQsrGmglJBXZe+D/5IDRbpLdBpYDegXb

vCKzN1FXS6D5KUxKDvGYik4J4l1ZsP+0qP2U99h+R33EVZ5HaOp6rogFXhVJ8ycVgS7LLL4h42Les0t6

HJw0EUPbEdg++cPALBaTtVF1ZiY4F7UmGkdYve9Tyt
QpNUVDAKNHkk49Zx3kWlwn5BhZAY/i6aSO1KvRKDcRnRnZO+whSD4pRH6lfn1Cj7EIQZ8UXzimKjyU2C

RnxJ2VbqVZ9X9OpE55tDQkaIWNLj2z1VP+03syKTbM+NrMk/LMHfGnuGW0fkBBXJOagRoP0T7PYokaTS

QgmXUu9UMyed0hdS7tmcaOgtjHZzDvj7p1L2I6MgSf
g8VziLPBMtzk6tAYowLff93eLXGaA0k8+4qfljBecPfTGCL/EFmQcAfN0L9AV1RDcSBpVkVDKjnNfPL3

4a5sYhd3INko8g8yb1HuiLjJs/zpGQil5iMpHre6zQ3BhEWEZ2TbT5HUeDXpo/TFLII63ErwA0QXebuH

zftuGVX7iPOpYLDP6dyE17rQICHCTNU3JCOaowbrEx
4GOx8eTQ2t/3Fw/Wc6FmF+zLPdHMDACShHoFULi6NAywSpOFy53poRrhCzpCcLnaZUX9MQuUaNvfBvqb

qMzUFbNnQF0nl/9FaYJ5q+GEMtZL6977VzNA/IVgCikXczBFjpFsG4yC0GDx0LJEumsKKuxMO5aCDkUd

ZRW/g8MH6jasmMQrjPwRYIpwOqayyICTpzfCGZQP01
uJpJxRqz9uZ4Kz+Kc6Rh5qL/wTEeDH7Ew41WfhSHc73hc0+tAlau+dLuXuQo32qv1QarYbLOBzKcgY59

8GyaDj6RYRA/8oDAFC/qG/VzUTHFOJotF9fJSvlUmavKOeS81iIXzexIfyTdsQ+EgDHfBTqu2OioWCbG

ZmjChS8PI8n9PvnIbzDEz4Poo9Q2Bfo604ME2Vb/gI
kq2ZSMb+R3Yac4angGEDh9nRhfayV1Fvhv6HJZn2nDr1XF/5DTCg3rAB20fPrBOa2iwYP73l6A1/PRDI

nsIjFi8JRPrwN962D1RBlWJ1qwbwToB7TfyiVmq5lnscdwmwwucZpsMABvKHMKlFMeozmPp0u25NyeAF

ggNfsQ8Ks1LES+T1210EnP3a2gDEexAwTchTfHHGYZ
IjIqetW12dMhznFrL4xYv6gdzTDqlBKDogtUNySTuHFpAecrDAeCHGsB84BIAjU/CcbXrbnsXynx0+Eo

veXkUKbXQn3K3xFEPWgA7D/KRqaMlVrqZWJnCQl5II5m/6scEYx+Wy6R7FpG9k6Gnnp7CWKXF4Rc4jhg

erae/EKv6uQPVRceisljQ6taVX33mgwT26HDhcR0bb
PMwElKd57BLqt5Sm4YnglH6qvVu+0Rn1qsny+J17uaF3GYzL/wxApQtigLSJZBSmu8o3aFhgeixBUxlX

sNtR+YcZE8Kv9XK/724Ft7CjPCzfFOuADYP4VQo3RnBGNRUVdYG4oynXfSy8O9oI+wLWdxv7wck9iA0s

6+qa5JsQIlYs5FBqR0jkopT7GFNoydB3Prm7qJU8MJ
0BW8z/fftG5NnG3v25orGBqyi4mD700j2JNnjKGtqs2rOd75pG/QOPTFg/agx8+Sy8qJ+7CLcDGzmNm5

a3TEyLP9oNV7v04tS5Oq4gbgXxbGJQPvi4OCIwrBT2viXev65sfnx47o0DZblYO3voLM+xgQCnicFpSz

x9E74vEA+tWW2hZSx6VsQtHhTf+DQN6CJxwqrhs6wo
kA0k0fETI7EoeFcEOksJYQ8mxDYzW3Vlpyg2ECCXtWP0katBfaJLMljbsjQIJFCMIn5+iz9LkYj5SXSx

RUgymRkxcnHuDlgJvmretLH7CuQ4q/n9JTPRT/CXuXFU6Bej2xmEed5udPkDe5H3QEIVvlFQs2N/d8zE

/FMNfzU89d5W74XYdSuc9ndGZo2/x1BXZBt8LCUZPy
KcZ9Hycr6U7VAVHffTFhhbwdSieHYDnGAaduxjk2LiFDTveIyPwUBGocfJegcUPSCnvfIGbmjiA7YYlw

rRNHoIjawkJALwPzMvepBQhE84JtQ8VsjQytS5EYQbKIgVwgecVOyrEyhKydOp14JBFwTXTRCKXpmqKh

K0Cg6rGRSKRdxBaaY3krXHviNw32CEtcb+vW8JuVdH
dKVZ0oIBiQZy6O+LNUMUXHCtICAkk8bDlTWgAtEiU1SjyYQt2ns0mCLgaxbNMpJdX2K3DQ0XdIJFdDj2

0gggpSgW749i2b7462H/Sy5nUr4L7cUAunA7F4wnTBZqVV1zAaELYfv6XBPpTstENj8ZaS7gjbVz1uNt

aSf2PnKlId1DNKSL6uIivCrOFKMqf8vuI3N6L6j5fP
+Uf66KaONkc5qcFtlq37ICPP+l2zvJHUgVaQQ7pGWDxyJoudNCN7nPrFXSV3ZnlAMlLfCaAU9i+ZYwDW

QOSPCVWiTbL+q4aRZAS5mYkBPeQiCDB42WQvCihLddRa6VtRThQAJFLVjiKXmyJCUVL+T3GrVNywYkPS

mCJwCBFsX65Wc/oeATC+exPqv4QQeWZ+ySx/VhnUTV
Pkm07Q/eWwQ0P7Ysg78BLfYnFeCJxH5HlB6fpGK5aQc2yCHmxBm6CZOqGbuRdaIeuzyI2YRGLZ+5uR5z

T2Pv8NCUeg+IuZRFWTtIYI560K2xiVjCkrWtT/MQm1KpMf4BK6RvMhdGyrqI7usqPizTW7JhTULBTEKh

YWSRx0cDsQlebEh8Dlwu28nC+QWDDDUog1XwBoq3lF
1hKfFMthhGxseyjWDi/KvRHiWBGRcr3TD/TImAXllaIZELYWalmyhuPo796bp/u63/YOSHt97llQ2cpu

AaEAD8E7EUsNlpRF1+/87MlJNv6inpADuQgY5wGHkwpkHIjKKyEER79dThQfrgPixxScDfZDNkO+lOdY

Lv3C29ifMUAx/R2+3Azy1Rs7VpnR2feQSXJsIOquTV
voDCIZGJfMbiMXdXqWCCItZPbzvrSY0YgdIupvJahjaFapuAqw25m5Pz0ia6L/ZuHFD43b/4Eu153FN6

nurvEqEud/bqG0N1UHxk6a7meBdUp4Jbl64TLIFq3q6I9EAuriPEU/eS+4rtk4LNauEFYoARzFgZ1o5t

rpNav/z49+dV34aJjQvhh7s3hpt/Loo5sEvgtwVSC4
wi60DJrbptLzAz9kCp10/EJy4bs3j1TAS9oE6mAB3M3ekhowI0tUiHZmrnYj//S9BLj8ve3qfvDf1xc4

U+YeSVSK4rAnZKCn00/DR6eBwnFGgTBVtwcVbZ62BhqUffvascFrbP1Qh7AXvF2YhosVNgUe2FFv3vdZ

s5Tcbw6vI32d8WLjPn1UiYU9Fe239dna++Tun3y3U6
5ZapSoNq/23T3h4cwlX6v1C1FwipnMZ+gyHggaD2Etk3t0jDG6ZwxypEX98pFxaRWgEAT1/35+2Na17C

no7+FlxrlsE/3cd6yFYfZhdiKPayBOcG0mxcgq7XargS0RkX59LeEfabRqRUFXi5PBjDFav8WZNzolPh

YD4d7CC3NE+DGUfhvOFYHkHQJPec6SOW9FVEvz5yd8
Pky0bTnSa2jSc2JGmVIudAYZfw+9ynaoOsAAJ/vPlSFUrzW/Hg065yaSvCxSGL6hiDSO59Z6TrakCiso

ebZIo9aQey6h8oowX9JGKEsn7QFS1O3MiceK+CX/8PJh3nTv6IIW7tm7JrRIPlWGwbkgMjUteUPqWgTN

SiQsgEThMgFJvQBkBmWEOvEImuSA8CVHktDHsmBTUm
Z2PggwLdeXKuIPXhEw8UYANsQJ8ZMGRahXWvRPGsPhRyCEJHTwDpBGCjKZYtqLSEd8ukVyEbUBP8UHPx

GkpgMH0IYEYgRH3Rr884YG85fvD1ZCOjTi7AELKyFO6Sjs61xCC5DZMpWuOnGYJlutQzDHHggvC5WQ0L

RcWqGMA8lKTnAncYGR3TNPFhpZMlHX6TXFIanSPgWY
4+DQogID4+DPkmwhIqGdwDKvQuTILpMyjSYpReKXzvKbnzdFMuRY3SiHB4XOSxV59hOOTdARQcM1JzNQ

I5MTE+Gp9NCEWxbBHxZC2LHgcS9F2ii2dEImB/H3D/fH2GZG4hiSQVKFgb8KnY8nMK6D2vsU9yaijgpT

XVki/x/ar+dXKuBPoD3FBEwgLFdOZGq2Xm1Iw05+zs
yvPqn9+8/Szxfd+7+r3+lFVTv5Kc/qRx9zavGS39C81TayutW06fN9QNwHMdLTe55JEuSWAyp2P438M/

YthqhE35Do4sPssnRkNBMgfQ6u5iA51+24cBTHvwP7/crlCeVej12Xp+Ft4vuftD99U/0/cOXvteiZnX

DoalZal7Nbf1HP/1+sMnr7/y+k61XDfJW9zzjDsFvd
8zWD9xpCw/p7aTVdqvc0GpEEanSIbOu5iy3q9WZgiZnmzij3/FwK7rSyJjg5bT5LMZSzF1/XUa00J71s

n9xs8mLc8TUoIcEW9du05MYeRP7LVgTyy/f1W1X/B2K05uVBFROMmRnDQuUm4Q6YWJDdwCD1dBh6vvoq

12gQeNSjINDhtPQYhM3t/8Xu6rP+hFICkOHCrF6Zij
nakfOdM1Qutt9Oc4zK3DOwTAhqLvC1IeAXcD+36JpwdviV2P0//Iacc1C9O3pi0hFRMXf9Uj/ybWKDoM

YIme0HZ+FKU3BFVtqdaDi4EHTx0dqmztGX92Eic5GL0c0sjdWKPYJGJYHRF4XnDIBV3Z66KkgFAcekJo

hMiokHpYzd5TPAUKnEbOaRmoqxwgorXmWqk21Z/tgp
PMFma1gIr7m/hJ/ah2u3jIR9z9XsGw31joY4brl3og6Rv2IpxS/U1a0PoLj5D+3x1tIGID+WqEcqwuI6

m1r9BtwswgruYgtaXGRAEidGbLovru0W1PvampSPjT09GIyusFfZjKPflWzBpxo9gt7beDq8wlYtvm91

WL1c3BDsLn90pKN47Yp2ITaSpIwVu5akFO0kqrbJFV
igaPODKnIEQw0Jmz0p5IcpIU8Xucg6wvZUn39PS+MC5r06uWZ7jYyR7qUuJHTt+ZyCe80heqYrQdaIiE

8HAmcrvhWKa7IHHsH8J0LJKErzrnlG5wLMQDPJgsIKiOdZ/AiGjQnyjkuJJoJOSKpktcsodPuUcAEI7i

l09gcS9IglWGlIykfv/jiVf+SbGT17mCqdJ7Z4RAZS
FLKYBSrk+QXGeS+JCecDt0jJ0qddQGOLHOD0sidxWFNGGjJs6QmDFY9B1fo8ceacWrGNUBME7gl8FZlp

QkPBsONMqZSdIFN1vVAmEwxnHw/nu2B5O5Pa4qOKTF/+eC8jX8cq/0H7XWmB5zXpxaxP0jWgXJcGT7Xw

fN27pN5dbLI1udCP8qS+k5DwviXVilWKSkcenk/I5q
EoaKLbsu8QgWLbWKViXyXwOcmYXyBEhl/LUZXtWNPh4SjXaXLEsbi3CEqshFVzoqxCUxdmrqZNMEaf5m

+ypUKBMQklfCxhDK5n84LLKbPY6eLFAh2m0yYDTlmD+FzOkti0le2PhWVIlVrKBsjoJavurKD6yV3osY

CEUuYEmVeCRPCj2KVdrw2ofscaOPu6hKvJhcUDZ9mX
wjhw8yiuBW7+ljRfZfQa4IkLSJVdW65V0ZqKNNwhkAXKIPOtxMTYDSxwRzmoEmK2HKyASbVQ1fQnMPhf

jmzLv0rzgCh073QOaetz3DkUJe7mJTKJfTUYhFdfkZEMPJwbl9MzMFWXKkNTVW+8kSr3wwfOeW5oHymL

ApHGgJg/SgX4CMUHHma1VdBikkdJb50GUZql1Un9Br
nwgOx+fEbhYUXoJtKetgsN/LoUEarlcubmNC8HYKAGNjKLQnT4VdFy/Pygb5peJxtFaiJNjirlFKwxlU

CdTvilV0vzXRnsOadWJCTCg3tZWbX2/Je0lSq1no/Fvnq4vjpnKss+darQ5JavquwmMQhF8aTFl6HB1e

ultNBpCHYSEjsquLWWxto2arH7tQBxv0brQOEJUbDD
d3QcjqitcYnSo5WFHuZORBKPSELWIYnckrrC/41q+7rmqTePMftUARSUFS+KvVGqYLeeqWTTdUGqI+6Y

nEVKW8lj93uiWLOIdhFiD6oZlDyMRZ/eTIYAtVAkGeqiku3IQDIrGKsG6wPNnneTW5Qxp6Eo6093QgdI

dBMtPDbsJLfFxbbnq5ylFOgGU94l+zHmDOYL+Tat6O
T/Mkq/Z8Ceoee/u9fDODRNlgOa6R5SJbtnvaST5M6+QrZKQ9v+AdDD5PWn68DyfDfanD6VIP1eaJ+9Nu

yLTihEFlNWR6iLduBxutAjpHMxdMNFUFXST2nkke/7jgmt1PmK2Orl59BjBl4dK2WPbOdFi7hCxv/WdY

MVxxdfj3OWy91QDuh6Wg9WG/JBdApLYD70V0tbb4y4
NxDUNzntanqBoufbCjzeiH2uCSO2WOtYYKy4MMwFKYAdru9C7mv83JCydKzN+fZgMILu4ORgqfQXIVF+

W+4SVzaOSy04Cpf4/2/IE0LwPDJ7KR3dIrmcz0WJ040CLCYZ8l7QIepiWA5pl4gnKHNVEUtmmQw2+vpZ

L47ZK8UJ3IRhcea4isNZ69tTdhgmlbx6OHX7z0jxjH
v5lYM7Taya10vKKCifHu1NsFzbNTPBLO1LorewmRbsK4YGtS1SiIKUn11EiP5jEOWc2E9NO3UyFbnN0I

Gt6pt3ngbNxCcVk5r6aflhVtE5NaJ5Qzxdq4/PGIFgNXdnok3tVP5F9s8WZv7U338RXSbgauOJ7gudiD

LS7LLwq8Vzle8sIy3nZOZ4hmO2CukXkpYtErM/a6GA
0FgYLdTMZLRgbw8+GEHkc/6HIRTY1DO8hobvrShm4qLPMcz16uHGreTzcnSdIKy+qovxaf+igkPA0P6a

Q1ZUaoSxBvFSSmWGCS87IHPWS+7B29ZCdPMEfme9xsO5KPMoPzF/xfQL5API2l1BfRaQjzoy9euZlGEE

nIbjFQFsfLASXHFJZVimGh/oOWAHGxnnm1tDhUu9R5
ribprnrXPYaluQ7uilKCoaGP20ru9i4M7ATeMXhMAMPutqvcx+kXd4AEX8Pq6Libzii5sOpxj+2wcdGg

Th9E5tD9tXSKBLUQ+XapY7hi2f2yTTvhNOJH7XM0O40t40rEEVpdMKnav+2Bvei6t/4DL/iwui3ZoMQ9

Lsfcc+2MImxFt7lYeQY5tMTpzbtikPAgLZKrW3GxAm
M4hTUukB1rx1rHD55Iyu+sTastKMTrQJiuDD6biZVMbeMXlzdMTgsddXMyNpZNfbNZHCcaFO7orenhfI

2yXSkJdPKqp2325mOyEHUOQ+IkoHRz/NA+5qTTX7nijeIN3mew6tj+U6JYfgnrMabiXPwdkiuEx9Hh8W

qs0uOslhwoRMp8uSrBVluw2CTr2Mqj6+FjeGS1vcn3
ZLzutrxvOl6MyDb4d+5xMCfoVDr3mdY1DFdireXy2kFNc6rvkH7r1hR3w9Fem2LH7gFn+sddgX7BIJwr

oV9PT0+nH71/zbxiMvQ+iBnJ3g4b0exNltph0vGDtTiK96Qn+TqTkr+9Kszr++JU/F3pwYyt8BxBgeol

8JLfFWnySzt2Wgz11ZM2Syg4LYLLC9uhNBNGbV2ZpZ
gOoYExTf6klVK6i9YNxadyWtRSbc1O1C2LmuL3Sopktcp+nj3/xet/7+hakssC0JeuDp0MJjokQNLd3G

9L72Ah9cJPETA9ugcLS8T7N5zI4SdEyXexq3klJJkXGblF94VlGq/dmEicpzcEjVRx/dc7ywdY0P8m0S

JSQ55JRzPrNUpJf1r06Hxk77sYb6KQ5W9BUfSGczV/
JNq0JgK7Tv8VJQIcToj3d92/j9SaymeRGdxlrJmIrrIuZM49cdlEaEHZmuCg55hkVAW6+QYEWMyK643s

OK2/ZERqdZ59qg3j2x9069390Bpx0/er5V/AWazUHU4chMjepl792qlMU+BzGkbbFNXAYuUyMVD4bgIo

aI4VSS1fa6PmAYz3IMHjr3LfNLhxNXg4HDqlDPTfL4
Q2sYSvDMClTU8RDMVjBN7VDFRbrcXaJhSeZILUWqFmRORkCmLmp5JgW9RaTNOlMYWMICorPPGeL87jCO

xyIv20UUiqBSRfDmUyKId1Ut7EMwBuDODmX33mmHYztNNcXuGoPMNXFrMpGQRgF2KlvLUeWKpqX7HoA5

DcSN9jhWYeVP0cnTHkU2LtP8JwtcoyUNNOToCuOGTg
YWxzZSAvSyBmYWxzZSA+Kh7UBDR+Db4ZAL0yo2BuASb0KWVbn9NvRFpaZLd7L6IbvDThhwOnNackmNGB

BOFbVGHfT3ihwsb6aHX1Lcn+Tj6HYDOkdOTlYK8AKevWxUhxZdnEBR3t3W/LV0Wk9Aj7dpNCJGJfrKWj

JhXFMt4N3qTFY7MwPDW65IZgzmme2hC8mraGA0tuM7
YXlLJ/HlQjarquqyZ/577K6cy7xyb0GH2TtY+utvgUSotvpzUq+e3zUprrUiqLOPn0Ni+u8065FO3k7w

ln0cwkic24LiFQ349E+KqY/Vq0TTtqx86dxgr/OO14dydF4ti43ILu63sson8iNXDWswgTvozIK/U5cU

ZSzwOAq3Q7T8zSNlBO+4CRBSCPD3FXZFp0moREWjfR
mJPZVlMEDNwJGWoTc6MYE0LpjLqZqRiDyv/Y1Rx4RLMVAgHxkO70p+dKyz4hAOWYTthDFIfLMVUBQcDO

t3jJrGKbp7ruOJcneIwmWQOKNH8CwJ38yUH/BlZmoNErVpBmWpvIzYqfJOCr3O6knjG/y8VTuA87Ss9x

cJnypeG8vm4MjBzy8VPWAXHjqPIifZzzM3a4Xk06xQ
tuiiX7nhfsugnMf6qf2mX65Hnc/FQ7YxEtpzPI6V8MCG6RuS6t8yBsml7GXlHheenwi5YT0BPGPZMl61

XlTXFQZK3PSCrThchMC+51cOTvnADFXOITLpE0fWRmg64iUvhWWPedjQOFUtzXBlKp9+K674lYj19NZT

oPx5t5zHqqBpw2Uz3RQbrgUieeDXGa2clenZV5CTSl
Gk9YisfMvfa2bI/5B0aGXiU/DSobda71D0/UmFNmuhpZegbTVFAASXZPbdUAUeh0KyolFeVQDvITqn0c

C0mopWY+bTpTpfSuHM6AQ5rqV5bPgHlMWJbL9c9AmedJ/Cwzl2KNaDaraNzXB7SMhQgGM3akJq7nh1J1

DJvDf5A5ERnudh4aomCYMfjgdmRqDAxXjUQaYNVEbL
R5ZZUj77sH4ls6/VZYb6LwGbG9jHRcHBypIPec2rD7MKWGYoYjckK5oxpR31jsFE+5a08mZdltmcKBaW

K6rUT/ckOgW9INyrDo8pexcE80uUAVyVZEcTHLzqHGlxD9ruqsWUiqrUt2W7Gmb0IvIJTIdiKMntrVeG

iwb0mLF6rNMJtdrjgM2mA5JkOccMMGqo9jGeSv8HRf
uJO62ihBnmBwQ3ijdbnOa2F72Rn8dXq+our8m/vXVx9u5sJua3cMls3nnSQybVboxg2iOdm9y68/Q7El

We3JLC7hy9UmDODpBPyblyPoQabKLsrfFKRlVmuSWyQhBCsOUeKgYXNgUZcrUX5AWJbdHCqoSXSqQ6Qf

mdLmnIOyILSpOi7UJDUfYD8UWOXvxOSuTKDyVxDkXA
FUOdKnLRYvPZHciJBDr5psCkJbBKD7SMAtKvjsAQ9AAFPzGU5Dp180XI97iwY3ZZFjRe4RMHRvSS6Fkp

98kCW6BHOyUiLhPDRqgnEtCLZiriU9IJ9AFdZlDNH5bTWiTauUHT9FGJAkcRYwDQ6MUWMueHTmMD7+DQ

ogID4+GDqmbwXtNqeRPtHuDLIan9UsPZyaGQfdViWt
BRq4NVXiXiufZgj2EBX6ULB3BCKoCGO7VVt1ZJU7HfizCDlgTBWyRgKbSdDrBob9RRX7TPErYhetGmVf

JTK1JOSlPljeIXA9YCO3UkS4DIKpKXU8XWY9WyR1WSSvGNR5VYE4YsU5BLCiVZZ4GLN6XHBgWyxmNXz6

ZJ6YJYN3IARhRPs5PQA7EuGeGTZ6NFA8KdN8VyczTv
RiZZedBoZ6RkbpMcKzXCo8YMD2NlIaKde4PFWmNQF5ZkshLGR4PSnfAjD8WtGtDkf8OYY7HpB1HkpiOh

OwYJE4QnO9PLHmWuQeYSY6OsI3GWXkOdC1GHU8FkT7TLGlZUxqIHD6OKAnRpczIkz9IFP6XNC5AVGtXm

DnLVS4GeT2TPBlIUIlULX6CuS6CHBkDzm1AKG9PoQ2
KVMmCvBiSCPhWzI2HZPwNlSwRBkcQtK1PZIqSMK7RCY7EwX0GVRgDdTyBUSkKGVlTzniZNS3AFKqHRK5

ErDqBJIzPH5QAWC0ZKVyCCLrHPPxMCMzWrRqWyR3THT6EYZ1BRHgTzAdWQn0ALB5JSCcShPrTIs9CLX9

OJPjEuMnXMg4KPY0LTKsDjPtNGl0TVN1QKOiSzOsOH
v1VKH0CJTdGiDpGZb9JUC3EQHsUnEzWGc6LQN0NGIeOqWcCPh1XVMASeUnAnZrUAg8MMN5TIZxHzLqVR

l3CEK1ZJWsUvAwHQs7MFN6OFWlXnb0XSVoOsV3MDWkGRK8KYA2AgZ8MKVqHhYyUBY0NlDjNdFdGhK9MK

X4MDX1FCKkNYl6EKKdRmJ1QxaePLFjMUVnODG0TRgj
YtZgDVPhFlUxFqSwKEedYSO7HcS0SnmmCxDgKREnByYgQaQwGdE7PRC5PwC6ElEkILW5YAzbPOH7YZLp

EcM3HLX8VcC6NnnsUwF1NTH8VmL3OfvsVFGwJOV7QoUrFnG9FKW7NbS8KqfrDvX2IWJ1OYDhNvxoNip6

FYL9JXI0HxSiDlHaDXv4WWBWTlGdJci7POp1XYY8Jh
beOvc9YES3UWI4SlzjQwYsWUruJwU6OnEjSjVoGPI5QkQ0RbzrDbAuBKQ6OpM3AVZaJRQ0TRB0FuC3JC

FhMKV0RGl8HLT0OECeVTB3BKT1LvR8OQMgOYY2NPI2XXDuQlkuJcn4DYM4RQH5DPWwLMfqFVW7PaK5LR

BzFDK9OSB9JdX1CEUmIEV7GUZ6UST0FCZwAQZ6UTF9
YcT1CRFoZUO9HWRmPNG9UJCxBLGbOV8cHKjrfbByPhnMEtTlOOThe1LgSFouMQv8BRvmKJEiR1W6lDEz

Uq6jvNKej9GxgGI2k5EORbFjXMHhYa9fsZ8dgHKvIRXwMYtPWnZzFMOwAFVgJI07KAgkKW6EEYMCJAxp

zNHuFHQ7A3Cbp3HjutZiMVFfUh7DYRWsDK6HnPArjm
YlVp9ATMEuKU8Qj848CaOmkGKbIPFbWxDwWJGgOBHcVDO5IM0VGKUgRM6WqKIlcZZXyengXGIwR5F7ZZ

5YJAZNDnLnQk6IUzWoAY5iyi7ZUNYsYXAqWnjYJpKnRLfOQzJdDFMgESdkGQ6Sd049H1K1JbI0dUKuGG

R2BGW0iTQyEsYyJJDouuXbJVHyTYxjIV8vw8Yyktio
R5ziGL1tqDKqF57thH5yBAqyMARuT4YifdD6K6zdjjSoSW8VIUH1T4oaxnBxDLZWZoMyIHRoZ0qjiYqk

OXUtRGHmEr8XVEOqDJ2Ry977SQOqW7WuxSAcbvYjEWBsBRABBqAtIx7SHaXrBI0smf9JENNlPOBqLrkC

IjBtWlV1DYWoImApEKG5AMJwNbAcNOi4GEU1UrY1DP
ImLCs0DShkUuEfCggkDwVaKCVcMmGxSIknHBq8MQP9WAWaRhHtNrl1AIM2AJN3OPEmMTF7HSQ8EoX2YT

RiSQZ4ZAI4OlP5AZVdDKW1JYB4XwH7TEQqZaMxIBRcNtF9TDRtPCydBKT1WXN3ZMSjDvTaMCu3JAP6Ck

TkXiObMEcaCbA2DuThVmX4BVBwYQB4GbwhKgXdDXD8
ENJ8SLQeOqLtLCYbYNA8DpJmQyCjABq7BUN0YmaxRkl4KRswAzE0IdivFdFuJMtcWwQ7OqsuXNA8UOF0

HrH9NzyeTfOaAH4LDJGbZdAkEke0SGPfJxG3OENkPNL3VLZgUnN2JVFcUmZxUOO4GyI2AXRwHCU6LMCn

UpB9LDLjFvAbLSW5XSMcNafoWTW3AQU3KYU0KYqtQt
NsKQGhGMB2PJYxUcWzWAK6PED9GJTtRcUtYMNkZXS9QWKlOwd3VDB2QvDVSbRkEJW3AIFhLOVmPDhnZe

pnSJG0PRO0PQXhWiRsANt9IEV6XRJzQxAuDPo4YGE0RTPaRvYdQAj0CYO4RMQzWiYpIMf7SCB7ZWWuBs

MeNNr9KHR2IGWtZtRpCXh9VXL4PUDnHqUsRGw0JIQ3
YIWxIxZuGFf5FAA4JQViCZvqRLg6AEM1NQTbKuZvXKw5OZA2TKGuJtWyMMk0JKW8HXOpPgHiVDR8IXLx

MjAaTLM2PCG8QdM1ZTTjGJI7UQT1MOQ0KXHlEaVyGKguHpFwSjRkNGI4BBJ3TOJpYdAmXmI6MXT8XgE1

CVLkMVC8FOOaMyKxVwCpYqLvFS9SRHZ3GhAmIHE4NE
GmUwPkXlQzUuKfYBX5RHM5ONUhEMS0ZAdxNBA2JeRbEoYyWPjhLiH8RiSkHzBuYRrnDdT1XyEbFkUuTY

PfXWVwPpQpJBP6OkS1UtvyNfW2ZUP2EoJwIuyeAqh9BAA2RGJiJrhbSsMzIRkyEtC8McqaEBzlEHu7KY

R1KnatOfm6YXt4BRG9KTNfYmo0CGokXyW9AhZbBsBv
CMaaMnT6RdbrDqA4ERHaBNJ1IPRgIRT9MSK9MhI4DTMqTTU1ZQM2HhJ3SGjuLON0IHZ7LlM1LENdWJP4

BYY6MqFqOayzOjn0YCD6TSRxQkvdJiCzZE7NOQE1BTDwInBsAWLuSCY7YDTrGlVsQWEeQLH9YMydFcWs

JHYjBCG4VEJePcPnNFAoZHX3WXYkNuDxXOV9OnGeUL
2HFV4dt7HlHEevHDIrBJ3eyo5MMQB2RA3LEXZfKC6RsZOzA5UkzjEOJDOepuahoS6dZZscILEyG2Cpdn

BPST3bO0FdaZDbDAGfnMQNJrFgGZLzKRWjEU10ZSmhCS9TEJSKIJnkaNWeZED2H2Gzz2VscwSgWIErCd

2DSDAiOU1ZyGAwtxYxTf0YVRKpMC9Es325ExQqdYSb
ROTvYkKlFRGlROLmYDC3AU6GPJWlTW2TrELixWSUsinvDKImE4X0DR5VYFSDYpXsAy9ZSdTkAD1lnu4H

LOJoUVSeCwvGFeXmTLhSQkMqFDOvJRypJI9Ae390N1D9EjS4sAVoLYL8WHF3kKNvYnWoTVJkvlGwSTEr

UGdvIc2sTB5GbfTyIReeGs9NtZ0GutZsPC1ld2Jwos
wCArVxJGWnPwgdc2UMyJVoLLUvF6hii6HZqLZmBHK0LX7MLRXwEH1CqKZ5oKDjMDTxJTEJHBaoXZZkB2

ZfziVYVOZdkdbpcM6hZJK5NIPgQn6PQSL+Xr4NXF1ka3MbTBrpXwLvUA8ahw3URHD1DO0TsTj2YQAlP4

BzIXRxNNXuu0OrFX5FCT2xtNliGhEqXnWdE1txhge9
wWVzWIB6JDN+Ly2RSQHheYDmCT3BFvjBeVyVbCZAIoVgCC6N3Ew75MrkN2GpTyMQWltsWDFqzTVmVJIP

WKMmzhhKEguiqYNFUbGv1CeTTm+PrIKIb3fRHG9TMpPuDTHT3eXJw2VAFCwNLaM0kn4lVof+v/1+/0yq

1vP4pNd19mekKSEWWXShAFk3H0rDnMJvh46PI0COHN
MQuGND4bTAgWDqmFQ2/8ukAy0oAGHG9PFmg4YwRUbq++LgI1Q3WZTtaxTDCrz3xL9rbvA6F5dxf7Ozts

AxO79sGKIH+xaErk/Mnr/4hon/fGMZQnc/l6UqVvqprScedL1WyXZ1spEnT9gSzXi44rmE4S1Rez25F9

fNX/drc9XgLRdt/rmbBFIiZQFAmqG4dG/kEKJ9F//2
kt66pII7oRKnkgaDbeqZU42t0VrUh844B5/OV3vebQy+JU2MxQ6LDkxl32Ln3ZLvLc6s6K0xcyrE/mkS

G1APojTbmV4ZgTQHjuG8eSf4ba+RjVubCdApeHPaYMNVKUvJ9TjMajSuRIe3hO7kUilvpyXYUGktMilq

ZZLC3EQz1tPr9se6G9Bl9mF/ErBrJ6upT2GYAGYF6X
up1WI7SUS+belSjbwMw2NewDFL3aF1P6sZnLaM/xxlI7wh35DlipFlAhSE9Rl9PP3XL8pAiCkiZZ99zu

Bb4HEgSG9HL99mz81j/Gc8Tw2dnl/gFuOFQqLyyqJ9twPx8R5Zj7euUiHEFfncNLUlJQpfwdg/QovQo1

oAd5Pg9rdyAmNspgrSt6psG1qJJCVqB5VKWSXm1F8B
gcixhvpuv9ju9PTwr9yXPlafsqXKjtM7eUFD6CL4hiLyUpJh9Kl9zqIucK+mTzbNK4H+zLWxT7hV1ndw

/AO7shglXnhwJCNRTaxLddHlJ3Jl8O61DYlj58UAnpuGL8mvT/x89UA20CKz9dLe3ua6R+RW+I1xFmnH

qBstQ4bA/XS00e/QX3EIl+IksV65RUmKM1sfCQNW/e
A4QGyJ7/4eHT1VeJFinFxIbw/zL/FnxPzUkbQdZiSBHkO/Qr/FCdbSNN9Bm+H38d/KDTXgpRw3cp8Nl2

F/R9fJ9ksTGS/di15C/8EuPBBfgi/Hs/EteBW+Hz+CD+BD+AjHYqxXmyHnbjpRpS0HYaz/UQbCe9lzZn

hHPJaanNqX+mPqP+ku4Nv2GyTNPgd4n+wCmGJy1IH2
MQ0Y3JkFhUAle39DP+BJ+Hv72ZbauR/sSoxB1V6hILIv5l/wd/jf+XyD4PLckRCSnXE+ERRAsU80lmeZ

DsLvEPkH+GCpp3SpfftK1SS78FUq7CzcJrs9mX/lQ/pVKd6zQW0xHoCLa7ANoXkVT9Kd1UUi+p2uC1d2

8Ch8rUYjQ+hOzim01F+RF+CpGMVMIml9OA9Ev8ia4+
eP56qxk2EVKisFQtQ6aQBVdFY0KutkZ7Aa38LS4ngE27+B05CeyZS/sY7mMCa6bQ/jD9nLilK7fvgrjM

IljYS7e/pNiH6jpVnY54S6sPTxVozZN8nibC0sYps8kP+2w6zR5Yf6qKfQM+UL+VWv4Qdr9/gl/BP8l/

vEkoJbOlEu4PYLZ0qEEsR/pQHS+dH37MUnMPenpYpo
j6qEik1KliJfhIgUU+PVxBP9eka+UsEHyR/SQsXbk7AhsZWZRQJpslqTRkmWcdVgpTLyRK5QRjBYLYkX

yQZyigzmxuBReAKaS/oZTxM9/WcR8QEesuQ8Skp9W5DUisXSmYm0OwGLZ3upQmr4HkaSI0SdgO+4w1ji

w9Ls4tpef7pX18s1bQPw7TjYm0BX2zc2QkmHa1DfRM
ns3cMmKyPhjquW2UkAxLq+nksjjowFLKri/oZuR/EFl8NxwGGY9BQ3MI5X6Nsw6W7aV/GdLJUF5Mrkp+

jFb5I5/U5WyxtX8I+At6vGxZgTPOgO3MA9Kn5LPkRL0EHegj7lXobeHyS/8apaN5TC5UtqiOBrYJfWx0

O4CsR1o/ZyaXN3CUwTCTw7dQkbC8YzTsosRbB21VKz
h6EUTOQSpJoYMI7ajEaQfROhkr/EpQv2pXF5EWQTBHu6M2zKS5MVTMybQ+Q3yQ1ySb9QgaBqU1+kZ6Hr

0g+l3pEQNyCndYiwKm+ifYkFjjQ2P1qNZjK4e+HQnqriYUlG8b5gIq+SCWR99/eA6R1MLFdyqg+Gg/OF

3ehu/vS1VoIu05Q/JCrnVkm8LGPLyabibjz2Xcj1oC
iBFusbTAx9WMpR3npfpcA1dUjFRSa8+gy6RkXzu6IRyrBVARqx+E1603fTAFOoqhX7YkYVwqDzrPNEh7

UE+M9dfCN/O/8DQkNUNJHrRX+fDRQrHCBmA0AfGMnxDimXtgEzaEqN1GZeqif7HocpWBrG+cPvWDwC9z

j0oijfNA8dj6pgSI4qQaEiwBVugVpW0lZbYvHjNfUz
fU1CpUjbLrmmKM1ZbZiRhBRt7LmL2oYVRfVnqZo5rR6iRfwbH5h0STiaHNqd4YehD2qgVFs64k+t+tE4

UXJvzSS8YDMD3sRbLjOjf6II3bo0Mnt9qW2KLT/9rwhmco1fttTf7LwqjBaOH1q9xJ2gj0xniQXlPmuW

Cda8ssVM1vlxhPO1xVBKB0+onoGW6teOq/idMN5R3F
ZZV9KTVPOI9VoEkDMYMLMJbUH396ctAxGk9PxtGLIOytKlviyGCfvecYDREZJItxRgXEDbiWDPKEY4pn

9m2kRgObOX9hBmCxPTeEg/v5K/lnYbXX6zpEvieUG7U4YTLQ0NGwdQ+F8Q08sfA+Pdj4JkIFZNnoZrBN

ohlF1yQvKSiBWeJ0dcvYdRlGXJ+PoyWgfPGqeqcD8y
I4A+Lg9M332vvvyp76yEKPvYqZCsqY9bB4z52C3lkqWcamUSFQXsTxH3r4CqnTRNIkvD+yH2WZrf4bV5

yN/vQerxngF6lEpjYBXLpbmSEx+NNlMKX6zPeOZfJuP0TOcxVRUaW1rniRAHzMNUf0SlTX+ktyG9j/pC

8eRMifQPG57HOIa12FaADq2sW7u3ShgSSQCG/J6Vb5
PXWfkW+O5f83NyVmL+2nloUNzPDNC6ikb6FMHak+B8FU4gRlwQjkh3iR7JmwknEMMbJF2IEC6IRstz7U

sKW6+ZieWld9B+4D2jrEU7aicu4LVn1daY4dKMqKSvcaZHfAnBASfaZtYHI4y/ON8kqqocDYWrzwcjZP

N1Z2v5CyIaAi3jYlLLMhtHCEhwzVgIFTRKDfyCcGUp
IE/eHX9SKz5ib5NLVHU0OKFxRLBhq4X1VyXn4+BPqrOm9b4r1joAEosRQrfwC0M8G54cy3zgla4V0xbJ

aTpE9IiYO1ec7nvwqxU6i8EoXDurwBe0sSOTWYU0jliwtG8Atsqj85zm1XRrO6j7mnmJyWSSZ+kdpKD1

psyJUPxX9uTVbWKhkf0zStrxeFYdQUaeXF3lN/KDkN
FlHRnHjTPmxjMMa3P0bcCMuD4D4MuDtQ9HE5+MwAlAHYlGwdC1Nro9JdnmYFsWK/NaCVOolEK0WgDhgi

x0A8Kbc5pbiYwUFm6dlYAWT/mIsbj08b8iwtZsdvuGjEd4ukoN3UOJwFMv/GiwZJUG0JbpgLK1A5i6Lz

bC2IFUsIKFCH4NOPRPDYv0mS28j2lu/8eGlCEKuY+s
ioqw8EUeRBj0P4Fr/KCnXtj4wNu/mON5NK6aHtdqo8JfgwTA/KLZvfArFoPQhWeuJrbBvL5THX/oLmyn

d22P/Kim+Gv0q3EEAPBbeHf0g1sHj1uif4MxL26Pvhe+KezeOTrMYPHtSTECgDOg1Gacgugy/l8iSZioy

Mc0CGsaef7Dof/HT1WRT02YZUWIcE4LXLiDDL6EyTO
efTtBw3Ktak9YfU2V2Xj8+3E3h8u5QdjHE6gXG8ruyZvcp4WZMsfzsiA9TcK9rRM1FWlfg6uVZQFFtD3

vHUQCS5jR6iIzKAXTHrDmC7WCdotNDQZeqWt/SUOFL1woIA0ZAmH7RD16tbHVozyCpy3nuHQYsvUaUIY

bKExSVOhANGfQgforlPlMP5MTloCKTJFhkRQzeU8mn
2xEEOUFbfz831zjT44yWmz6iqOxWYea23ND6tZmif+cLiYbPgu70202tGpqv72tOt1OmjC1q9pB+s3rE

sJ/Bjpnl3Hq/KNh4z0T7uQvMM39Og71mz2O0b77Gmr6xtPBM4ggA+zjty4aqj9RljrFJ3zo34nHF0Qou

/TW64nX2sX1up6V3f7UISIvvhN8JgaxEAvN7MlZ26S
gXh/tkIdCdnO4ilPudE8qUHq/IuF2OlbQkw2HDF0V5/hhqYnWa/ICuEcfdOAjuO2L1pPt/u4Kn22ntvS

zPqoTDUCUSZlUwaP+tSsyIHCC5y3ACHwEbezy4y0TJ0CU6tyRb6oMzmf7J2Fd+a77tdpoIpQ6b/Yypw2

jJLI2zXNyrnKdd6rlqBFkrbztY3icnve9DxEBV6CoD
lS4LG5r46YyHSEHsm8sQqAqsnK8fnAx2XZfc9PM5abkGvYtv0sDUbN4NPj9htaLJzfjY2WBZ2ku0G7pY

4rlzzlj3+dRD6hpVyq+9HSCzw3IhHbC9onepA/UBjA3ExPg89W8lIie/wVSjbCNWC+Dr9LyC+qE+beH5

SUvMZoGnlgeQVrtsLC5Zk3lZM2ynsAzi2gB4M4XHtF
zmOWqaqxHyFT0aQCZ9J9/IQAKxgMMmOKntZnE6ZQWJqxrt/QsHT0yriUMIySWsx/6vT8ao6nCrWalmNH

OdXcyBAOHWw4tGqRgjMk2Hx2/BFif7D7SY45q7Ncj1vWjQ2UVYHDeNylQ4cLLzPn42/ohvutguDJYO2R

1Yj+DCyFibl0zVbgeSYAwoBy25rxgbOnEUINGRGZ1w
3XTFQyJ1oJc6p0ehTBN2lmYahAEOXfAyuaK60lQeznomvj3YqSopK2mdieliooHbNLxBFUYieOR0vI4a

n6MIJC2O3Ukgi5QPEnSFZHpOcjOUzTgLrBZfN2yQOtT2RwSiRHF5ypNyuzrQFzZhDjl88N9pOgnnpJH/

LNmsZJhBOHYbHOykcJvqfbAyFBbwNsKSidkvnJBa1I
HEpmvMJ7fXGsvFva2BVobdAWiImWkGz3f5znV8kNlsA1w0M4okiBudCefyJXSOJz7plyRe+Hk1jA8/+I

cxU/jnqLRimDJfCdFu5HJ/x/TwEbj0n3t+9wD3l4HWwfUz2o+jc3HrXENRWvMAeiMoVOVfZUn7wEFfuT

swU2UqKjiJpQlfZicVkakniNc1w+0uGWhpgm4tE/aX
XZk+0qH+UT/lHwaXQPOEI5XR2/KevXyCFqCMpef0jtSmHXMBcn6XL/Bf9+xcf/yAo536xuJY6zmWPL0r

BJbaIH47sqKBehRdp5AfZO46QRKV6yy85QcBX1dLxhFCguplUwJJmIKGMSdrjtdoR2tqUNlJmIST/frG

mBP6M7tKiqLriWNdNOSq+mUgo4j7sv/YRMtNqeugnR
dTn+Gw1NYdHWL6EYM7ApS02Hy7slhhMeIE4cCDjMeyGZCDUwGBeGdmKraJFkOVKuaxsQas+GTDyaMatA

oLB4EifUS0LNbc6l77XuTm5YtZqNXPhUlsR4TcDf23eSutlLXgnTqwzZoIk8PoV3nV6sAH0zOLV6lFgz

qPR8WKMyFVzePhOJVE9hqx1ZnC9vLfU8W1X5/UCIPZ
a7T5aVZOApv3YCzvWxiw5S4H3lzO6R0AJA5izsbKkZ0vl0x5jmOQ9wM94vrjWa7xUMP9/KVgLptn9XTj

iEt/1ftkTo5pD/WYp/ihOhFmK8sQMN8fXVunZxSM7OX3RWAhoX4qUIH9523e7Bvh4GZVnKxZOh1ykvIr

K18yHAANEoC+DP1zndE1m1d1PeKM2o/cOuZI6V2ui9
PKbWvoDhZumTdovVQ4j0/hZT1yJPNYLHfAWKPzcD1SWxGMULHtAMbv4QXGTkB01XiH3dr+7Yqi3Oodbt

IXgK0M+DJOZx1yywACdeDTMAcZm1Rpki6U+ogbLVDMI7d56iGEqtVWgMMw7h6VmjxkUBCY2W6NJww9S7

pnneG+0zlfLS0fPbD0jSP1j9AJ+ON2Rsgw7WK8L0hf
+hWjyZ5JAZ96SjbAkQkkB/02r9RE8zn4SbLqV1B3O06/SzVYpnn8LmoegmGUUKv4zjznmE3RXMtRHfhu

SxUXY5GLmYLaqPAHcD22VAyvoM4Z7AWhkjJeLSjbZ6hvJXTWFWK433H0pSljejsZs92TZU58IDsnHXrV

CtzQiBpayAUt+efX2CYyaMQSKcUXwIBR1YSsr+SC5I
L5lL7yOOV77K3OSzUf2QjiVzZGAAStOrbdssiz2FWQADBHKLdwvmpKww3rJsnhE6+60u0lY2mcboqp4L

oQbI7eUgkv324n6eGFl1SGxA6i/YG/KvUQ9+doSHe6M/qsHH3megY8YqpfEJ7h50MeEHDsCwvzPuKUg

vm2zI0hwbHrhfz6/HLyVryiMy/zGMFiQLhFAGrBO+3
bHQl0CmYxAVPETLQChHth29X3ZXK2YRD2FOPIo3OAFAebpORTYC7b0GSgU+p59kTKCxqPgQA3u58Le+J

DMJuNEac/cGBIUVq/tFTBN3RHhISN0r/LADUMTV0VvrpFEbyz2f83sPCdgabC63/S/TXI/aDf21DVD6D

MC4R/M2ZMQwSpXQfb6JDEphJNwjLwwVZDIFy4cOfjI
d1uFAlDa9Czyu5WnWe/yQyEIcsLpqfR6jgYyCqNnYkYy3vnGjCBSyZB4wR794kqDGYyjOG7VHfBQ2kML

oQkoNo0dGLOTZQMNou3DbQxzVedOHSouzU0Lf+VRzDXja7qRTQzU7ldWET3R0wKEiWh6ibwyJB5Pu0qs

7fitK9RgnI5cgjo5gqlv2ZPjBH91q1cNaIppfmbO3x
9iAGAHmaNw4zp5KuHwQ9pGfmubpdsToOqwMtpKjcPLZJ1x5QSBjKewhWs72X3cY/FxCSEizeTVagGNLw

FB93hkqh86xQswixVOzyPjgs2EVPHx6EGn1fGlhyqHUEbsK5O3YUj8hPAaOn3K0IcpFcymxBLEzj4NKs

t73MHUSH2QnhHElEKFySZJfmEU3faJIg+zh3mhEDPG
gyYED/LyVyFssJUnISpVLyOkx3aao6643/+u2mR26+7XG80/30A442fdXcjl29HiU190TsWN898ue2yh

m/vJjj70UDs146+cU9z6y+el8hBr86A92DvOCPjHxHtdDUAj3cERousrEFOH0pvqKPpgM0IpFSK77rbY

q2vIVraiThElnNtmVVPLoQgMUjrXr5CWS51rIr+Karen
1aioZIhWSz0Pv4453pcM4cZKhvWr5Cew+WzDbStt/ZFkUk3ikdyxygq8oM0wiMzeX1kO4e869NU5sF3y

UF56AfWSJuRIj6TLx8K0WRPL/icJ2sdKfdJsBIVP6yWvtQNcu4e5ljLlR00vRLrX4TWsygZGRgDXP8Vc

ptaWTx4TBi0ZVzYIjaUNRwukXgLqZ+9k3qdT8P288U
PupBbkYfpFR7SxjlLmug362PDATJihC+tAXzIdzi0Gpxa7qKQRfNeCblmPogJExfVmrAfSWpne34EM6h

2wQ2W9WPGbncvtWzaGSwhhvTfhIzsixWTn9l8SLoiL8AIPzkuQwm8ryfFd7LCnj3a/75ggu6dMZqk+b9

F41mWzl8olp+YYjaWsvvDorywjvke3H8i7Yf7v2G2X
82fHfxh5G9BGg1WegoTe4JE4/G62ZR0oYaebbw9zTyq7MOLt4IRLBIa8McCn7wNRMUm+rmVSoWesv6Nt

opfRkhcAKTKRAFREWG0CWKMEiYZu4hsY1hjjf+qE4nbjWOrnfznhf0bRsOKQ0XGPAbBpoMk5145F88Lf

q9fAVL53xC0gq2QGRwEM0QO9Wixx/Ra+h0nuMhKiin
983kSHBRmfsVDcY3arNTpuoH0Y6TVmKdn3cR+1czAhhFuhhugZLYRyIVE799iMWNyu9cU13TtyZuY9/6

WKiKvSc5Y/12hfaEycgHVyqsZZTXGlunpVnyQPXMbhcICAYeaZDRQ/DiCi6Sz9r3M8Y8r2wqXj29qgM+

zNseNC4uQ1z4ZaTK6y5TQOeaVFcu4EQD9X88+x/lES
LJ/+sYRR7A8jlv6T7ebgKX30m9iim4/7p2Ps6mzemmzX5ZQNVH/xoqpiTmOYF7NsEEm24eL3851rQw95

Q5whH8DxykskLyWPNw/ItEHqUDKvu6EOm4XCeVyAb7qicoq5GiW50BKRDfDElJOXYAzIyIbiWNnIN5ZI

7y0tCXHzSSqNFXLhhREyirt186h1v0i/ux2lryuxR+
1JaExQm2m4Ppc1x14ftlk3llzBzM/qbr5b8q3aAqqqdmrkzgRIoyE7DW4JlqR1zRnb19PG3fOsdtWi/z

NfCTC+QpXybZLYpXQgdAHr5y3vXdleu0FqVHq746/xl2h3/tKNwzRQ+oN8OvcFYkxxCHcJP5z5tyRyO9

pSZs1EX8DhyOeUNf3vZaifCnWeg/uJhnoZ2Id4KP2b
9Vwn6YwoAfNp/ub3gXJ2wQ6vg6tu7wadNk954Ne1k/1jYU1779hae+Dy8u322q84mLk9Zy/558uppVNT

p/12zc2lpNVnPlKhDaJ6GQeAgf9hCpDzA6XyWjQjl7wZSK5RyJ6wR+xRiZPPrKUep0rt/E1Ei3BbJBOK

UgDNNcg7R3qROJw5CQsjdto1IfN0TOBruveU3LtdnF
UFFspuuplOcUlM1PWMz+jGvqsa9YNOPnq4zya+dRd/qxEjF1Qu2UsOfmgqz/Qgb7HxyvAP0RVUQP3iGe

1ASfg8wWyHIiGQHQwIyaiqLi3lXEXXAA38wJYR+xksy6B2i8kSBbj3Sch7tPmZKxlZO8FiuCXSyBZOdB

cNJJlmM9AMrwcLYpYmed/OP09MK4lvNg+onkoWV3kN
PTLokeEXj9fIhluZE0L+HRrHJe7RjwZX3vt1fWl23F9QsK/KZXCKzeZxWCTurZN2secR7kDiVUfi0R/6

JokgvMhxi6uep+NE6lF2da7r/h4kuiiHCsxheH4emfXc39/b+zsrkbXOKRxQ5kL+ESdmz6HPCNkRWvTq

ba+VGH2VvPWZBDuzKQ8uKQ8oZZ5pGUiaknroxpNMJ4
MQRwe+h883CMPhtIyNWnJiUOxy5MNTBKz3ZSfMip7ZazyaJmnvNn3XpYx9Kk+6fm+B3LcGumnwU3BRhz

SZXCZNdMclX71W3K9JaY9KHn5WWGV4tvWjvTcaF23uyJMxVCvhBkR2MDyDgzH8NQUnL2cSA20XKXYNu+

kq1PXeIGpHfzuf+8Xezq/nzV91b+rUhx+mTt1/1cp5
s++898mDypswxS6Dww0xc3l+PJfX4+J3Oh37jHSze8s18xd7Xyph0Zh9q4jm5BovnfNl6N7k08YFeAcu

+mXGJ4R5ZJACTzidDreopSVHsI2dCX57stJDUXWMQvehvSTZuZswMIafZKdLJ+UublRSPlkXdwpof0z9

iAvULJ26mKURUCgcT4h0D8vxN0TydJP67+Vs8AD/KE
GhX614uR8ph1its5KLz4FD89oukAJN6U0ikRkiO7spILvjepWy0BnzReN444Lux4jbffEMi0lgOg2JgM

vrP0xV35GeB+kb8dlbWcP4be4ojEd53LAcQ3idPXWMAz8SxBCfRW29tpiUPkjlGiwxCtASe3UnKK7hhZ

buEU9qBtZo3YJLkizdpfWuGGraKr93Pux5541oP4cR
0GmZ4WKMg+NEmvaBK4Ypny7QbrByTVuLhH2dkbrcLJ/q3L1ROuLrKZ9wmRPgjopMclJ23KmO7zXH/8oS

MUURcNuR2OTrToUMJkfpSjx2kSHdjgmtPSa/AA/YPX/5BLs7hcx9yKo475bBDkKfCfl2cxGPfpj2VE5a

1CJm4IF/ItsM5yXr+c+GjmVq5e+j08Afjno+vZerfG
p6tpaEqSgSeEyJcPIQfHV3ovcyAC4YcfUHlUccbsGBR4cFFX+IUF/Hb2NHR6uKiDyWWMpeAPz0+1d4OU

v4Ejd7qKPiKAR8+UIXNWm5JXAUo9ZtEDvBNUQuJHgnf2fTv2uVkZgn1VH4CtqysUYc5XOM7QPVKlvUeW

XkovH1Wpkjw+HAfqsp9xrOjoSCj10lE0SExpzyCf+x
pSa9ZWi9qhnoLai9n7PG8r2rJ31/x39E6mSpKqR58CICv2UTqPPnja52J66fIRiVu+d5L8EEoW4djIiR

qPnpJ8Zka48u4g/Uf9n+YQr2nxsomu3jPLTtM9NDOuQPMOlFPQjpHI4mBfhlvOdqUs7CmlYlzXNcS+NV

U4qrCZIPbqzo2THG4gpG4f04suhTrx5Srbb6K53otD
J36Xj+IH+F64oiyW/IWImEFubupnz4IdXbFyFb4jBFIHdwUIH3jkV+GkRgDGHChwYDYoAJhv1Azmgf1N

JlwsGXhHlowm2VzMTm1sCaLcEO8EoQfgp59svjEuWMOSfhz9xyJ7y4KB1FZzTCnRvykLLYjE8pM2JbeA

/v38JASD+jkmf/qxNVPyZGLDhSlkoGTH2T/JFM/uSl
rsee/BD/28XOjmEFImxqHZmKzuiWkloauI5tk5u+vt45q04GH94aH/WBb0bU6UQ9QA7fl03pzou7bbyL

bTNA24ZOXNtGFihfhn0FmhSxoHQEgaCsvkUV+VgOJh/2dJZYL0QicumT5vfyRm7Yaz/T0OxaMEzHP8Wh

TedmWRopABP4sbptI31jTktXNXPDdYQznBNPFkzyZX
FOsSjTnooMqUQPRThxYrzbc+Z4d12xu8xMcDXggZ8ChnNcoZMOMebaAeSB+frS25uVJoBUbBdM0niMdH

kOYxm/2OstVDqP3Maryul5vZSi8PH2DI2rO3Di0CWNB0iT40EBvvJonCcqJyQ+fvvLHQTJrJOdjwu4nD

Oa+yEVF4C3DJvjGAqTch9C5AFut6iLRIF8icgPOnWU
gpySYO06ACIbybJCMr65KtmB+VjFBv7jYdA8B9Cw7/mEJU4LWrnhy6qSg7Lk7RxyBtOgAvke2kpad/fj

CoP6opmg6ZBwNvFN3eQ6Jr52sZ+64h0NaesgJy4NtWpzGnHYgYeUFJSWge2Il7UlokejS5OJxSq/Gd2R

wl57NorFCGxrouKi7eIuQbzk/1GskVeshlh0JJ+x1N
jtUWvSgPJ5McHBNBdS7/Omaj3eyfA4cU8dgYO+hmxy/upeVz9Z4eymqZhXIA1s5QmIsNSMek7U+Tx3RO

WmMxkcWZlOh2UO1FQx1zgh6W0Dmc6eiZZ2ljHnP+Kv9TPYN7BNz5FKYIPY3MSCxjNC2vcVd0pO0TpyGD

vT9XmzICHH3A/sPZETHBQk8fhu4t2sgTBbTo+Lyim+
/lJpDr22rlx8cTTw4UFn/eW8Tme13aSynVT2isP/ADh7T6C7+/bMb/I691jLI1909ieojF44xec8/t2p

r+1aI19mM3SjkZDF5OC6kDwqpjYjYsULD79o3mWdpgvXt427DRO6Vl6AsJPn7DPQrnpOxnxPLNiMbnFT

HIrma+XroXaAIa1PlpKbjAN/o/x/TJT/3kT5yM+gfO
awoRue9+o2wK06IQ1YycSNzIdYJ3XJDGFHHemLcNFVaO1Fa+1rkBkUM9EzghcIwtlQ+nmOTsQgziPa3O

wAG1Qu7Uu1HteYK4Qn8zDvIy0zNcWEQ/sVm8orDe+2ixkY3l+4F3AHQ4//bL/vCr24wsdw1AhSLt/+6K

tSB/m8l5a6aHB02eR3mh/13Bf/0OqjJJ2ODzy8I8oE
C5mEJPIhBWeH7+nWVUGJGcnGeMnT2vnCViIRdekoO3QLqUuYckTX7LOfMYdj/8BIFOW/vPP56LCEmE2H

3ZvDuXQUUGj4L4rbJ14cduSOV/HFZ5/gkmf/uY8b9Bkzzc36DozQTjbE41mvcPpARjDN1wgxcIB0vxJQ

H4+RmJWQkPX/5/dlFdJtA4E7Oy77Z4xtli/30U5eic
zVMH/8Kzj7C15+Klc9bkX2FiR99ruJpcFX06tOELuIx8SQrI02G2bzzt+P3ygMMHsrTfpadLQXRKinvs

0WIXYV7RaQwwkZoSV0qXrAC40nrOFvymdjxyNwL9SAMg96772zj8IJQykv1bZwL3LP0XpsIKSoRZ3HM5

cx3xzG/Cyryk/KRQsOVVUATydAatdUy7azGXH9osGW
AfFTLGYWfcEHVMUTy4OBtXw4MrSVsiVGeuREBXmgUCAifn0RnOGF44baoHOCJ2D8MgaXINa1IwUiCKxu

Jyu5OuO4fWRRkULPCHcDlhL2zOj0Nw8fr5eGNhNWiU/c2Y1B+yiSQcGhvfE0eSy97FtiBknzzH+bOrTx

a8uD7U53/CkpDF6zioKcpq8W2ls7+oKBqzC5/9YT55
Lbt7EWrUNJO/OWv13mF8TJ7jxo93EIVk08F0fbPlcA4zixLRIuwO3mUa0UFSgQO2klc6R3bs7KWAgWHR

yewovDQpNhpON1jXdRJZhvFKNwq4KHPCvXnpkOkVxwudnSuW7qcFQiM03givljtvg9SCsfjXnQxiGy51

x74nUUoRCYPCTd5D4Fvj+BLOQMWeTwVF+X0CJ6BOPS
u591IrCvs+gGsHxV8MAmtmhz1APWC7JC+NUNZYzRYlAcgFogFUH+vQbb2MsewtsKli6syJILQ4zp1ogk

2F7G6MQiyycztNVnp6JtSHRES2MQIEWirNuexsQADTrmZsmpGEmZhwMQBFCIqtht5QESmaC9eHr79Bc0

kWsDGTpLgbmbDVuDawwMB4A+nAdUJPIBbcxVy3uD59
HsJnBl0Dxd0XvPpoqlwGZa4XJ9vOBSHF0yI2jezZ/pmAcyc/EPcy6+BekcvG9BmjAor4s63+tzhNkFUy

k7QyUS1LJxtGjqM5qhA390XmQYGbgCh6+ahevWHavq67QhEcG5cxbIEOkP3Hd0Mzze9mTBDPZMWPZky5

G5/1linPqJHBZ/Tg5/5TJAFHx1Piwi/yp5B/r5LyoF
HkZIdEA/Zw5xGb8mHcxIPKWLUe0PL4SpaNv5XFLSaVMQvOjYcRRHXmKu7MV5SXCiQ9X7mzSwuIn8Ijns

8ugUel7b2rHdjA8mZU4bw/N7qpV6px7iUf9fiq8g8MWRufOCJIiuucwbpgnAqcgaAcuiduXIn1MKv1OR

IkzyAPujjNh+lxi8lZecWEaJFxNXcdVKHQ7GZFZMyp
mwz9NfPZ0YDkO2msy8QdCH9KO4xZY5HocmVbGTKPyQECCYg1cgWKvVzkTD3cJCA3GYqneOkZlppvxCi+

wheTK/VF2p/r9SAy9SodgF7qKcas90axlf/FLbDfZVNtlKBLnaNsA+uykovor5JPZ71V49kXjEkFbZj7

m25wHWEBa3t2+MyZPDyYrJ0obBWFMJZo2ERt3HCeiY
HQCyZ3ktf/Y92wQmYx8sMQKKfC0CMOO60H0pEWXdur1yZlZmzvDShTQ5OQz3jZqbtCFqki6eQCzKgaJ7

ozIssQpnpDncZYbppOZS4MrD57AvGQyMsKpuKw0YeNUNKRCUNiewoWVFn0NQ2kEDlCNj4MRjHzfcz8va

m6zPMC9j+rXUGjH69s+QCqxGTr+WHOl3LmHAsjEhZk
u8n4kYvr+00f09CdVG3f0gGmD2HqAqNJ94MfT0mKfO38wQ0txSTsLiZJOW9YTlSGjDL1me/nBKqSH4Ci

3CrrahLd31qb/YH0/Lmj4I2FdUTGSUko/re0D8E3rD8mB4r88295+upKFo2FyJsAkXotRfhDaCePXElk

3dWueymiJEDqg+miwmfS2QbRDFcG7iq6AU6VC6dNO2
JEkmEsfJCk+ZQae7E1EaISx7hAqQsqECtlFKY0dHjTHKaUZUXYcAftKIBWM5uIf0Rd5efN7byX53JjS1

Vw/MaIYxQ+rZoMv/mz7I/9ChHmBTBA1quGlOOVkohNp5V4hkko841faJHIMCrm/FJFqyR0ZrzYqMCVEE

iB88pcRHX57n2eFwWTe6i0+icyHyVibiumpRMB4aga
RAcrMdV2iNoRmqYbMf51Tr2+TD630U0mrwKvXoPq5/7ZyTOtApj6U6wGmKpMie1Kr3NE3kvxHrUyMjHb

4aMDV7HTMSzKABS/1EeA/XKW1b0GmLPXvxcHc0/zf4vny0v0HzjjDr0H+3v27n/P1HQcvz646m7iK+PT

XHbQbgjj4zWsNU6Q0G9FWid1oY/kwHcbSIp1Zrjjsp
BuTNQQKvqQFtr/UrBtN9i0JSdIHEqoTke5pQFiHeBtgkHmTs81aOZMo+QKdZFBFl5EoB5u6cKIQXCSUH

3eaSPQVUqLkuZwk33eEmy83WyyDKNLEjnl9ALOWtNr7lHwohstKGhm8ejikWknZMIGibBuGj75TOgHxK

cItDcQz4cS5ijbNjAt/H+T91eFSm/Or+P6Q+IPvxfP
yrV1PP/hVbokH00+Mpr6T+nmzTK7IvB9f+FaR7popfb1QJaKmDWO9f4nmTG8KuprlHd2n5mAjrFChC/A

NWLmI0Q/UcEFonb7Pl5S3l/GsCyF1kX2+NbEMK9iuyVazFDiDAAA9YJAo3cuBjWR0UTtF2/2p8/dS0DO

LqWJx7GN3AbRnY06QswJcHOc+jgyPPQlinsAdy1uWm
PR4Yc+0D9d+u2uvthspadeHfuI85LhiTg+ezesxk3246ck5R1YWGb36xpaQUVadtvXKGdMSVzhU3cyvk

g98A6WtFc+H6BF4nOZgwfcKTCkPrDvyc0IHDTY2JeqRwcpBlem9MKVSy2evWJk60QC+kUsuUjkwJ1//c

ms8iypF2HOZA1/fQRMMrX0kfpk5bIq2RCs0QhmSc7P
tGvfojiO6HZ1wkaD9PvvF7R5C4WT7dg+dNBOndLSy3BMRSTqBNUTHn+UPBiP+FcDkPv3eZuTF8kdBEgQ

SiCPH55JRlU5Z2U+2Y6zoIrP6o4ElpDq5NVfXSsGhyNyJEWoECQsJ6O3MTlSLvc4FAAVgN/bl6Nl1zoj

4puKywfHKAqEyHWqWqLvvxI3hLILyNSTGQd7chmrQJ
bKxxzh/SdD40UhVe8K5kgRI7hZEG3TvroS15unfB8ERe9CXi13QEFaQRNbHVvumO1gxA/T6/f9tnJqGS

hya5ufQRkwvE5nzflsg/k8OPA99Tf7m56pHB4V0Q1Mi69E2odt95mHcpcGyl2172/U06bPOONidH3W+O

DWRzVxqQeyOivcAxMNXg1EGS/N+Bo4EugbPHnJqyMf
0SWYCfyDFTpQwvJR1jCINHCSmri/YjgcssHMyF31J8Bne9GvWqkxYVaKFnjXNctJj5F9oT6+Ml/bnq8A

XcRdLo/PEHMnBvCuEy3xLH/GeG13Aca7wtpwOVbKlRZQRyKmKzSGWabgpeEOXTDHhNYvVfWjUw8MDmIw

RiQTjCt5NkVIe03TPWOkNr+FIErcL48gf7YdX4Hw+y
3vUWgb0ytoma1Z234sW690Tnjunt1oFJlCyu546MqFEvxiJaiqbCjUHZdMFmR2zHn/nDlUuGSJk6s6j3

K4/jxI165oWJJL7ZQoPs8pCraKYFjKoxlNB5bmxjOC6GXNuW6P4RkuCNb/eXD2uaNjGkpG/YgyFB62mB

16PJzO7CXgo1VftcthNYbH/7UG/mRGKqkoZjeDyejh
pvhGfLJodNX7oFMy210Wm0UYRRbA5p5n0WO3rasTliMGNjXefW2DVrBQ/hnbbet18O5qOvpuCpt5N3yv

rIH8MAqpPJnmQooUZxnaEYj4nkUzycM4karHX9t/1ufnJEPRkZUm8kTO+7HHAQJ8yO5Pfo5X3q3Xb2Az

T0yJhVny8Zi0VNn8/AW2c1bPlVCCCrRhCSi4wlQHlK
ZWIEZEb54+X93G6CymBA6lOUKcIke4+73RMS27qAGe36zRoQs297q6z/WCnscmx+kyZG9vX1g4qOB852

52s2Ed77JT+rNReoLXAxkp9zTeC/Lsy18PuUv193hrnh7Jgue+FrkYKB37uVu5l19UGF9f/gZfuCBN/F

UvrIdfdjG0ISFDZ1SKU/4npigq3N4l/n4lOuD9IUsG
6CGAT8uPS4toBWVepqAipVGfyvhrN7fTEVIhSymOrQFAu3iLjtd4Ui9WFeP06USmH1fpq5hCtuUzKEoT

YeED6cdK5WmcLUlK1iQA9vI549dn/plbnYtSr5g/4XXfMG/XA92fr8xynJexm0TBqVhmr3QQskwbyipP

R/llhY1UlpnARqpWqzFVkoxUsKIb4OCwsEzcTJ69rA
08IgWQuDaBn/6BHDwiANMR+O+yh9eRFcQj+zj/Z7W88jx71ZmCxR+5vDq1NbSoGMZbH4FQuIGTcPYphw

eXSukDkIOebzeQLiibtUQL92gaei65ybVYnWSwMCS0So1gzQ4ijmshXFO762vto4V1LAN4o79JMa660M

dp3d+jie3JJ0AuzD5HmqtLv1OdVk76YCNVF8AuMnWa
Q4FyvbkbagSYiYUTLS0A9is8RLJMRL94G3ko3sdTB5RIExwFuk/RUBRujuMKx2p5X4CsDnM3tTkEnkAw

OXsVJmPWKlLXBduHkGRlGHAk8v7YQXad+3REsXXCoBINh41CL2mCNsBQSzUU7FekzRs1xL2ZQIsrOSxa

aLqy0jXp+lfMK48MWcHgxLGxkWcOSWxDRgRWUuXQ5Y
T1tT0yNsi1SVLTUlfzBWUqQBcvUFsyr+rrtLoSuT4YwnR7lgdkY5TLpH50P9bwx8Xy07bmbMr1rKRzo8

02wN6cWMW5bglUT0yOPFHXZB1R2D7AoN56Bw8hDCA6IkpkbHrreRxw9cbGj/XfA33A+qks3VIpRQKGoz

q+CTgVF11MfwNeSYajkY0nGomU5EdU3gLH/PjBlQrH
0fYpfv1oOjJyeyOyLU3cM5n/EfAwDU0nbH841eba71ZdKMF90bl9qfrlLjvtAR3NzMVPgf6wtfQLbmJh

wRrheIx8INySNk0EPxGfVr8zm7ql1q6tJWwDkLeqxxbp23SBJZ6XISYq7f9mR7ntwkG7BC8oqV+7uq3B

aXO+OdjYGUxP82rQ4KA1FKRdSG+rO6ETI6Jlgx7/H1
WpRgu4gs1h88rKzTS5yaTAXGqWElkEhSB91F6KPbb/VZrn24wcrHGgQ4bC5DkqdC1b6u5Am4z9X7JL/y

HfIO1+v8m/IH/J/lD+9km59bk4zT0P+3vxeyx2AtELzub5sPtelW7VapOPutKYKDlX4yqAOYun10JGxK

WBIJwUWMV5F8BxVTOVHsgGogKtNiHCLMFRw24f5y94
iA18bGo1evsPuvZhxbzx3GPLlClFwd8tnFeM9rROQQLqfsJYFB7U3vTPTlrMdL0JWc0hs1cPsdqsDHbT

qOeYLjuwYAHXemq6CrIyyk0Z5ef34K9c8amAyFwMzUUZ8rGxaoCjjGxllXw3M8HZ1AIy648fQA8SCdxz

GHveQk09lOWZrFc6FQhJTM1Eyp69vl7R38Kw018mjP
r8SMhLVsxXs8jtf7pmZwKcACB1gVElg5u6elNclyCpzmGwnBpcDg257uOdi385Fq7Sn2lNefWOvw466q

u3z+18ZagXb3SpDu3o76fpGbVgLIRc/pMEzvN1pnVngDPgimP6jp++LmvEnZ1Tpne7aSqTziYoFFt7Fn

EhD11tEzjRxadqosS9m2GQMG4e1vDkoMgsYHlz9a4A
snkwgNKr6wpxdt+M6uMWd/INbe/iwWJrG7Sqia8KX+zJ+PSlEV+VTvpdiUnkFbqGcPW8+z0jYC1zg7HM

4j4wyyt+s56plu7/S+h2bG2mjX98lfXNr51Zi2KHQ3Kii6TAYK6f4S1rQ9fzhT1/F+UioP8+EIVRCdVM

I2UZlsxDfEKecNzxf/Ymu84ysBxFl2yqf/sFedXJ+8
Uf3DPRcfUk1Ya4P2wDLUZmmvu0AVB/xWSzuTrI5B2PJ3jhU8Ksjy+xQ60c0piS+tm4la7ChM/vAofh8r

9hR/eu8mTay23D84T0yogKd9QyQvvYjNo5JBtQ07ebvJ45yZZ4zZwsHiYmunh5ApxIYZeiFPNlu9CuYQ

SEJnwuigEs7ADGIt9cW+Qkseez64O0g1FKOcI5mbuI
ljZZHoJ1ggHckOnrJLsnoUMDdv9pIm4llGX5kPVbDctNVqofW1bMe9z7nX5wAPDd6EOgXZVfTUPC1pRW

44RMK4GHNHpczLz4o1HZAQfI9mV4fUdvza7chd/iHsfhx3agmhzbXex8sFrujFqywVL5aDkPIvYT+KJC

vNmyN4sIeQQ4aniLeHtsyIgpaBTJMcADN/GSlqC0t/
U8NrzfYtRns3LSh2sSr9RmwrjdMhRqZg0cQYt0zY6oWcA/+whd8LkFlXk0LYTd7jnR6ROMVlKnEOrys2

CFBjnKrzTNmvYr/+lSXGRoLzCdP+fV6vx+zlFyYrLAwRnFvE6gcr6wv2t22GHBr2c/fRLFwxfPXyG/Nb

Njtwgyb9u+M1xV+4J+y/at+QayPz26v+KpF/+6QRL9
75zrVTk74OOy5hlfv9Zj2wyBKgEbmIQ/f11kZGF36prR2pWDefK0luvX19hFGrreE0LhaHlp6b3rMn0Z

vskNfGad2QyISgqxHINVBpBL6AHn6weB2TnaUdHg2u3Teq2BJK3GCp2RblNUMdM31bc+88lydwiZX4zw

qR/6PgZ+5/+P8R/Fv+sXm2GpgWSJ9fL9+w9bXAxb6m
hWuEj/L/zZ/SBJ3x96FZ6kZXLqliwR0HJ+ZYWMxEAiGdNjlRJ3sYFW+kBk5rF3nnl203Fw0dErVsXSHZ

992S8n5BPD+n1jUrP51fHGxwttfJkliYkseIErgxZSRal41wZlzGqknmS18coRIgL5OCABwmU7cPUUFu

4+FNpQQtdVtgLmmR57Kiq6icQeWxeZHladtH7xrnHK
yZmZc85WOiLlQYaCE9vLEKy+zsQhIYMt2GaywaZFSztodeYyZolBNqpE8Y0ruASIMLuZWf/LPgOYqRwM

0XocaCIlDUweqMEK+WyrlHYMG9z9FJ754+jsFPh2G05gkktyRkWwojxO/Jr96uzhXeUCfPr9tKA1w/Kf

Zq1j6f7gP2Z17o1H0Ej8I25+AXe4fnnHoOxIkU8tjC
zJxjvIncMYbQNKJtBoOXa7UVS+3JWxiSWow9jPJboShVuBVfoMoe4McHgXNsZwRRv6hYKFrWHjXHS++9

rmup8j3BimmGGDmn0gc6MosP/dhsCm0x4dn+n4xq6FL2N3WqXohgN8jocmtkYNNZtkXCvGcC5gRzd7qS

VZ+8Qn2b6dqN9xbETTuliONAL8TUhqqw+aRb7kz2EI
6TD2Z1kTne7Tipk3T7sjRcNT4BGjyupY3yw8wbKCUwKsbVU7Y83TwTepxBdezgZKo26z4Vd0MnJ1RY98

rEjmRUsugTVkd6hMJ3YfNHLFFWwProNoug02GQj2xkTuP5gn0ZRSjWHhSur2SYR11RrMVhRK0Hr1UVEK

s49mg2VP8eD3qjJt2HrFfZVCdm6nre6lO56Dpuj3sZ
UWHjWqfbFHoil8jMRC8TwO27RBwLeda5cX0nFiX7xXCitw/21x+l/uTqm5s6/vM4Thh5aycQe1tx2c35

Tv+TzuaaV08MWVBEI/PmXfvau++/gaywgvOQh0kGcA99lQ6CswD7gX4VSiiq3Vv7P98FAkgv9SmAxeEW

IgJDSN0i4k4iy6Bc7E6MS5h/7vnW//fg54xr+aLRZI
edvfQxftagOsP27eXJRm8lu9zk7deRAxFIXUS4IrgQoG08d/DCt9u3PLYaVmOlBecHmX7wuT3a5lsNAv

25ZKMY0uBFCHf3Fs01k+Zxcmk5cnZLiWDYqCq003EujiG0L3D1xdR5YWCBMNjVOOjzP9Ku8xUt2gjnVR

AUpXX4i56hvy4rjvN3m5k8E+m46YCmsoYsgmFBcRte
5ojHZA4aKZ3jpNP6rz59CAgjfLId8eY1fSKceULH7LRlNhWf/GLKjxRi+MGOBW9K8QBVe6S+sTs0Xstg

mGkBoNdN1YuKhgAspYltANk6m2TASPxZO6XLiH2ip0Zv3t9/86Z3238+bQ3BV0up5PEQe4751b8Mirtp

cUbsB2Fy9chGZKppSzF34b0obw4G8/uoRBwFEjECvM
eYLKCh16pN0EdgoT6UrjAJUhXnBukJSpxvd/HFGDaKR/RFGHQfcoxF40RdmXeEuY/aZaHoXx4pPsTvJh

9VPRG0donN0/rWqoSiwIn7k3rtpKM33XSg+1x6iAmA68Td86WXScB8Hm4ydkWh7bZuWIK9sQtw0mRpMP

+6gc/QINgT/9+f3mir8+jvQswZzK4c1lXzH7HN0Tzd
d2Fj9YfEGoE5ZQSZZeYQkaFvUXW6OP31piqp1JP4J8FIO18Kc9LM0trEE8wlx8sDF4oIpfEDFNsFj8Rp

ASYiMMBhgHMDAgxsCKOyt8BeKOYEuNPfDjTxJXNNhcZ13ZYGD9fPOSwmXHZTEfG0lXczc8kNDlo5bqDS

6eyPNOeotSBR2Ki3yd2cTk2pl7/+zP633SCW092b2h
T5ZV3CP4rjdxiTaU9/rIlXypkPrBxk80PMMneQtqr0aZPUfY1UkIhmSGQFsPp37OJ19mQmI6iP4JuVnx

nOc3vQ283wnCXoqf4b0Ztlr1kyD8Vf63XxhpkJOqalFQ+e23Sy4cQCezOfFPIssH5tYwetdPRfRcYGrF

CUyVrbzBWhdYW3bjVzAJLOVxaaRMgpLDgRE9GrOFoK
wxynpcxxWkhZAeqVcZl+y6lA0mCPuj1hXtkPZBAv++fWc37NeNywN8O1sDyjBZtuDLLgyAyIIsaw4mha

vfa5Ad3sn3Vp0Za31DhmtnOf7eFydpRDMqheghNCO32YtLqBdAe1VbKZNgAztmPU0e/xR7xP8n/A9inX

49Ki9f2dQxpIeh0wKDYlQWarqw1j9G6p7xkqnsPxwq
9ZRky/an1JojpUPn0qxn3KSKlLRUBjoF57Dd5d8ythyHY5W8Iny/q7qVzGv6Yh2xAWqkIYdwbgedG56k

Fw9486i2GlYqdAwS8yzaWdx482fHXLJ0qJ0ktcONYurQpSiyl71lqpSvv4zV1ydguTCibWcpl8zhQb23

fmAfH2dMFofguHvLHMK3ICFEJVfHhTNPsJ0RlYEobq
fojHuoAatFeF8U9w4D0x09vf3C/sDktkUgm8IFzer3pc69/3N/k1LxL4KFRxgaF9eDe1alzdq7i506pk

UZ8M4+v4qcjAMS3eBkRQPIZwsdjRA7zAmJuO+Fq5bFxKYi0Gr1hTFp5zaj1yMxsYxpIv4VI92WMPhEFw

6K8sTPydbR0ZsODLrVCW2xqBwCojbmgNqY7vLszfTf
Pag7NGkIQov86b5fpNfzcEd+4Im3jLkoap5C9i2BD10GQoD6EL0Hpu0rggLfY6hLBF5+1r6NPFZBbyXS

3LaCVPL2C67zhUTJ3z+fjEdX+9y19Uq724nVSsQf/d6JEsHh1+4UXOn7PSyJoodiRVny+u+nPruQp10O

v38orXo9zJ2/xWlr6jd/k3i8WwYYLVc9wgGipkxRfU
dV5/Tm9Dnc0gcRiebehO8xCg3OOJF9e5b28M4pZZuOiaOnQDSGx58n3o9QTdmyf6bO2EO7mHiTc8ghwJ

7ZHH6vztUVyuDCdLkVinzb4w6zoIKcJSjQ1XosdfHCTEf0Wrufuj1UKYH+q70TGJ5bmHEX98JnnHpglR

+IzC04Wvrc/f417AL6FLj0tgh1akrawQxiIP400Vp3
hOXATLOJ7NVFTCqf/bygQdoOP9EheQ9dYqtSQHcMrx7xRU93addGcqnLAjGs17Zr20cxorR6KyaEMuXU

M6WyZKoqee1nIrXAoPXz/VCpSkHxaqMGwbn8/sVOPtsLH0fpEgb0Ex/wHi5cZlkl5xO2jMss83JHA67c

Yx1HmkI4n7qBo20SpyVjLcoKKlfeQ6maWMzMM1GV2Z
mNWUGTyiYhqI9agfwxEIOoqfnHideBh4gpxSDmMKnK6hM25naW1EclWvhATozCr0JMORAKRFINkomxtC

0LsQfEL5nOqqry6WUDscyUKS2iw0ZQ8TUaZYEgPfaktBitBNcDWHoGDfkGgmmgk15KWM099mkAldAq64

Y2utRKLOSWzUpZ/Ab+Rb+E7+LX633kj2swmFwcyNoQ
1xs6vpJkwCKPJo9dC3iVFKnPfvfsvC+2uwOOgdFdZo0F8u7ZNt7w4ZPTjK58iwJbn6gMS/7Y/tZAXcWd

3e3s7//eDBM14+ceYjQHnAC+57RusXll0n29hI5X7Ybm8Nh+hA1rOBWUd2TMjL13F8fA5fnDVwdQyH6J

c3qlrnHG9ol/wRaJrPT6IKmMT25sniGbW0mNE1pGly
xLwslGVA8LLJX+6OyoO5eu8PlBJeoIJerrl3HARI4WW45Exo7EX9r5KbCoy0FHkEoEYoYmjB7hBEsiE7

hhgA86mVxVbbWU+x3GmjejfG/754VPxK+o/4g+n3NArXl/WMAGRPwuFKlfR6EJtuZsY1Lm9WPWRemNCj

1UKaqjrBlP0wYei0RctXJYJrAxjVeQ54F0vw9AkljM
Gtta1pW8QrZ4bb3OXfuEJRTuGez5ZpGd3Ewhr8cTv8TlfA7DJyv8fPn16SUaM0dpBe2qZ9xawwtKhaRW

Qwgq6HxE7iaXMfgaExgc5e/m88RZf/cEdBRwZBPrWqqmzx8EEzQShCT13yCp6HhpkXCeAPbPQTLCFc5T

uMrXMaN1O4uw/T7VmsZ778Xszg25cK209yLqP3sTKQ
uP4IoILHcmA8llatcGdwsY0wrJidQxy38hsTx3tOjfXKaATnegwMngNSsK8wrlnxNXegGo4GbZLLw8Ac

/I+ssVT73OCjdFfTnPlCZOfHt07xGSheL50BbuB5L794rOnPxRJVanfb5whoTL2SBS4n2s8EDpT1Wrr2

275mJCJJxWdaemEp7F/hfz/iczlP/RrH41AgLYuOxm
gRUUB4LZI2F3n+/Oev6IyXjjjVbusXbBYBTYhg6kT6CNOZEvEMLFBMTwPsoIOTLFPIMdEOS7idKhN51O

n3ecvm6wB6y/FsI4ufHsuo6GaeBdd95pUAkNUQOyWE1Hs+XvhckEz2pkT2K7cJz//rCH6dvxGCWY1AEG

BmXMSdN+SF5A9gBHj2xMrjvMJFzx3Dp9PMaMmJo2Ao
/Jy9/KZbSK0BKdKGxt9Vuh6MsktYlwcWocX6gvnzTls96eOnKLQzZ/Sm2rLJVCfVdtzCffDx9Ce/1sdY

bZVx/ih/VPmr//TC1OwbZWk24JfNMbaUTOtKSUhHAkdkYtnoCuP9z4L6ClhuQMpbtm7pi48oEeoJ/ESTER

9jukXn1TP+KaO7jkCfsjPNQwsBrcJu5NC1MnodZf22
fiIlloAGCz9xlTOwLPaZsiYJ2iL+vmrbUaEpTzdxXyTgPtlPuBxPF9uZNQYhiwZtXbQYfotkqmapkMgU

xnjhpzyJdluONMYkmhX7xuWwrI2aoIFtlgHlAx77/CcNmKC/RxkCQkfzM7ZnxxotOBcjpIs3bdSzzChe

KK0e5Ba6uZPUIeq5rJ4uSioirH3hcvUaEhlMGTC4Dr
Epz+3N1+nhGtMegAEe648bgvzht8Yt8hVwF/88Uis/poptNPy5TMzj0S+SL4u3IEZbF1ax2PF2YDGC8/

4S/lB057xaPnMjYk8SqBQb54PZrSDbwOdfPKBwfQ12Hg4BMtuy44q1jJvw1nNXWiA+/J1RShohfplJM7

G/el+cX7pt6uXWSBVeejfMFMdMOOW4X0lH/TVayZ6y
K7Aos+m8VmV+8mxXm7Aq6nf7URoU3gBMY8SOFPGOItHNaGPMYNXlCWdqifnzwPRj9rVbvSiqBkKkPZMA

wQPtqqBf4pdzmXH1O/28nM9Y0wrm8XPgYNisVHZZcjA4LOUQCt1FiJ4FRve0jpIY1MDT+8G20SpJh/a1

ZWlkCOSIRTMy4YGdv876+NlLF+9fkD3J4uEldmjib2
B2aryqSlDcuc2Re0R9UnUvASlKKmgErbhGE8UgtuavjV2s5fhMHgC6MZfVLjUUqoz1W0Pw8V2Mnkwca3

Q2ty6tOFaGb+oTf7kw3ouyZ4+ZQddPhnC7iJEvqqlHfkqh6S8bamadnKlL45yt7tbQqjEqRuyG01YUxE

AEncEZuH6tZawT1vL9hGXNG5/DYTyGFoWo6wyzpPQS
GNzxHQ0Zu6AQ+Gr1zirVlD261zkS43UPFdEs6HWODnZqai6QFKXv4DreU/aWfyxJ228XzIftfyzXawgn

IqCZK2pPfPv9BCjn77PfJ+7thxqPx0bgURmmof23bNMAXU6CWwqhARjaPUhxgJAQ8NKtcD6NyPvIKIcG

A4NgUjS9JEgbNEQlTRGWKrBMq88ajyP7xFjV2SxUaD
DK2tuoSoLjc/sej0Rv8QXtgHykYt1iCUttukzqnDiYWa7h5jZZqRStPLdJV1bJbfbruPyJcRxTgBCVfR

npHdWeulQqIcen4R+ONg69kpptjVSXQT2b7Apqd/fs/g0FFoqdbMqihyzzkWjYVTuVpMi+068PfbcATb

3n7r7T/+0h587LqfaOoURfTQlMWOj6dxAjBl1irfVV
0oduKFWCWXZz6DP/eW5w/RCfzzN9XP+QflXey/IP5smbgp0k+gJ4otn01A9jqf1WMX3hG3Ehzb4F4uaW

qi8JuxEmCU8Okb1c2PG6FB5YctZ5ZAjW/LT4mYt87dlcfrokcX919MZrlRZLVvvPtuMPQoAad+ZMGaRb

dMtzRb+CqGh4amWCn5Yjec+UWZDLpa0k0ZVLNE2r1P
GeGJFJfude7SLkHbscRiQLuCLa4Y70kx9VRvcaIWjEkyUYp4F7L27eHGPLRcViJMTyURX4BGlVyO2GsA

bPLk0Z+ynaLGZ0y97eDz7yItWz+Hfd+hjUkcjPVzIrW4DZp7uvsTCk23AD8w53VId2nx+dWBC0XqjN/c

owuda3OSO088s4/mDV6nfDaK5VjgD4/t/4BKkzsBIV
4HXHCiL/EVPV9d0viHo+Yx2Rgm0/GTkGIxwbrn4ErJcFJ9COjIIHAGEdlLeeImsH6ujPXJE1ksRHPrZb

1/1oVgll5ynNqIaeJF4CKB+8PlKCkqnix2Lm85ACdZWc1w0JW5uHW0vsZnSi2S7sOdO0uO5PHCxlwONQ

sPrYkHu56zipZk8M1+xflDhw6+muCqJ789Afif+ZKR
iuOIzp6Vu4t2vPisRyEKj/uiLetkz+xLLRbEOyChBw4H3LjSpkYj2NjUYHaJU+HKtLzuMV6um6qhQ+r4

Qk/hIOViZVhxXeHMwluU+8X6pg8re4AzKn3v+SPLe02Xfw81R5eyIqSdGiCOausIybrSoyqq9yWfNJjm

Qpwk9kuLkXfhC9EBUZy6sLUVKyJUEEOFuuNLlw0vYn
zcXPwLy+PqA6UP9Xqw7zOWF9SnS54pbUv8LuErNRXeQhMoMoFiEyg13A6ZOhQoMoFiE8in8WauSPUUuS

YgDslSMDPicK4sM1UBWqgfnUNrw3RAemNysB8Qs9JJVPgVCpAHC/rvdQ5ULL7AoWDO3UFtHobVRcpzQq

oTY2ABQF7ii5T7cYrIeB2SmryeQw5aEy/euCQu7zn1
OhgPVBormS4jJ5U3bWK+4CsgYmSVfBX7FfxL8g2fqMlbCCXVRwJTH/iDsf/C9EcJA7Vgo552XTVEkYyX

WkTeyWUgclCTBIDDZOiRXTxGkGmyLlVXlgqwSGGtZEAgOX1VjE8KAY7jClLJwcWKxNw01Nwyox9NmRAS

9n2/tfc+WUeV8xl/8z/9I128x7fx5YH8fga95HqLYe
5DWROrfpDPLGaSxBuiNx0b4JtpyxdBWChlnl+w4uDzxElX1weF9HaXmNVrX7C+jRWOJ+0K14LWctPuyb

9S1re+83Jp3fOtie4g3znmJ6nXboOtydtF0NFoW0XaGjpugXnRlpDaDp185U4MkP+3eP8lgzGQUItAU0

gIjwKpLXhp36dMEF6/4M/9Knj3V6ePwzPW2cO/Bennie+
X/8ltzVkE8Kk6OculvxV2n5et+yX8/kDqrLigHcb9EcZXDTs50t6ThtbWptAOZ1nV4BbLJamBnV8mYbX

QLwhR9vOkeYP2zT3452OSDiZhB7Wzv5bhcBK3gyS5UlnNjfMefu1uL5mDfupOfF31QQZUXDtBclo0XlY

rdNjfI1eHvtv7+bpj2DNOp0yB3/XW5hLL1l5q5a+5Y
p+RXc9d++RaRUO9bwdh3/WJLS7ozmcAVFFCYN22+Tv6ju81kfkoU3jq2f1zccJ09yorwmDkfb9fHaAf0

EJzr0SdOinCn+Z9PBYaIm2Vsom9ggmYJ7EifBowZ1HaktDeaX6WYxE+v5km3q8qg0YjXvTgyw/+aGY1/

EGMUnywJxNHMR91XGOQOYYxd1Ybfmy+KMUIAA6wpQh
wkaaOJKCMwloOAd1XjG4Xmd7Ri53tZT4r0yu6ofwqWndmdnu7k3PR2W9Y/buqKx5B1qnalB3+/q1GJus

2AAa0jPTF23bQAeANAuJYLh5u27BGySMav5kLbxxZRHN8B95KJBtQmtVP2N43HOnFgw4y7/8paWY9gvF

4zAKIQn6vCRlG4etoNHgbpvJt3v6G/HZeGBexqwn67
yRVc9q1usmAa1cLu2RiyKa3R6EspGdiZif4G7B7EsVNeN9LiAl7xkNCw/kPNvNrTjwkNVvVkUSs7bI4t

OIvvcCMEI2OmDkp0agsOi8x4VZoWYrxNwIuNzpiv+efJOTJHEkO4UxTQi06o3NgmKRF8kl2hXvHBpNmI

ooy0qCjX5ATiIANdxw2AeaimJ1Fo6L83eU3rIU2yqo
YdBi7oaVt8b+eZ2l18SOXJwnkdQ7Hj3ciO2gt1v42rDKoBXLt3qoBdzs65EqUMNWpjIhKu+y0IMnjHXJ

Fw4MreKIFYR+r98r6+H6yPGIeI6g5kuBdgYbW5kWOltCgOkLV0zxhL+HJrPlq1FuE9Unh/uzRaDoz7NM

g76unVx6wc/OH0ffpH9C2Hzc0wtxRGBQrW8XK5hfbd
Rugq6b8ZMZ0uX8crLpjiXx+KCzX/iQM+LBhWCho53rd1z8Nr8cFeESdJeksh6p6hwsliYjALMooKj2nG

WOA/26bFnOsN9Oq86dlPdSbF8j/4HzECiQWbL225wSi9A+DzcSX6SyjdgT+lIG/aG3MpGK1r78YFu/fk

1HGvMBtirop7tXwJMWXHAnmpOCNZ+wIg46WhRnqRgm
a+KzyWgc92hSJmygIabsduYAMduSxJu8i4eueJM/gy95rwbM4aAvX+i4+hN1o88qWjb76PBqTCQpEbWj

0aU1USs1PHe9eDXTeiNfi//+Ud6j8Yw78YFrH4dXPVbBjcfgZVzqoDVWAEshvC7KYoj4JxgqJieEwnnu

MgYUUsg9XO8njzdr8sS0FFBh6ZqmyfSXgRPeMu4uUD
fFy4rnLabjGQ5OWnFtcXC9UQlwvIqclcGlltRS6QGYm0UOsih/4IQ2vX44f4vBAPnUg92mVP4vBM49H2

xs/5UMvrFnHdJt+3x07IIm4rKIv53J+6aSo7jK/LEzLLqf7h2Cbd4xSezsgEjxsMdB85MbyU8sQuA1A7

mfPIVxFB9/bKX+NbXL65wgDQvvfhGQfDE28j0kB65S
u8Xt+KuB4T9l6g5Ynw6zhG1yxln/WQJ/D3rMSjczUpem3yy23m4Og3n9ZLnvFtl59M64VMPpl90JUsYW

uI/hiJzpuCrnsc50Qa/Ftp1FsNYkmDvGG1dnvqLGkA6fVFDLkBaUozW0TiFn+wX9PW3JVPXZgZvnb2cY

F5OHsbavajP1uxNWRDJjt5/ctDmSnYIANLx82fRueV
i1zvA1SVy2PbHDCm2FxmK3EdWgP7rfqJfuL5ORJJ14RrnKsROI9ASMR1Xw5IXfWRw8xYfn0miTBE7U1f

ZU+KMIzZuC3gI1DX6k+vct5zEjF8SGyomsjr6cNrgT4N6hKC6YnG9OTOG6LQyAaaxKRPsW9gdc6dJ52n

QDstLUMp8zZ/0cg5zn+bwRjDoQZt56RxB3s4UgTWzq
/+UJRqdB31VJjmi3yjmnE/VD/v2hL/QUhoHDVDiCcwj2bc9QNLGdZViLcdt2vhyX+rEqKXmjzG8tLt7+

0n2i4p7EzXY/3fWB+oH//dAp/ErXJcn0hy6BrgN9rv5MMB/Y2zAJQSBpBm0kgfLO4Qip+Zx9oVA4KXzU

2xR9f81WtxJBCDiWA3kDLa7M8rS1g3bo0F4oxZhAdh
2SIxaeUXUB8YI6Qt1D7FX3KTjLt5I+Z45nehdvDweW/5apAw3AypTwwgwC7FCrxqtbutSzD3IV7LQp30

tEqdQ8Mjhd5bzXstg9zDENJDNo9rahyqbhlQ+IkR04e1fkFJn73M9oSqWCGyhhDPxSiNk9Qf7+r6heCB

Foy4iWcVar2+9iuNO+DHgl/qCEg3gFkV6x3l8MpEsH
8Mr5BVcmAC1zniQYniQfVPGB5bqLrPUs7UoOVWGt4cI25jysR89BTbz3qLBsFwNC5ow/sGAqFx2C1voY

/2f+Do/meJGyt28914xiAQYgO0kd+YOMUmegiQ90lMW/VW667EgQTyioVYRfoCi/eS/19IdDzdZPJoir

nFVosnl0dKBNxdw4f9GQRxT3M3sS+iR+cshpnNjlCJ
kLmcJqAmtDFjx0RaOQAyEkbFe4nalFp5vHjggQ1wLniiKdFyYGApq8bJIqyLJPw5v/C+IzXtkfqqRuAO

+1pvL+W719ovYu8bbHulPkp14n5h+LByEdUjNZRESUEkUa2/enumhGfO5nM/gFgwjxn7J673OBa47V5q

L+p6KZ3SceVc16xXf/8Vwtik0umKIeR13rRITvrTg6
8k/3+YJdW3DaF5jjzNAr2TymT9kuUssgs1K8duBMDq0rVOMiIgpFlqM6zEe5X6IjVqbc3P3zQdgCQgH/

fUocxgJRM5bF2WN5ip51R7vB12sWq8UgWuxNNRedUAmCz6vgQ/kL9ZyzS5uwUbR/ut1RcER/7A+X9uwb

8G7KdhjK4j1SujOGcXh4/U72wt959rODAuQcqzUd4t
S6uKYdL3VZChdvR7yk3k85cM1mxergjF0T3oI7enensQKzX2ti9ZRjQXdU5wuOukZ1TX3AJ/W7y7AkhX

2j5tRDeIRZgEsGZ+W/2a/4s+ExWX8dtBzkYLQF0/wTDMvwsVMnghHRfbwg5Bs7yYCTPx18sPH4Qg0zW0

M5GWQAdMDb5y1uiWnV9tV3xiYCFJ9HzplGCKGPifRW
tbArBmFeHxrbioatEJ0xb0IeFgUvlp8MYeSbhrSIMUuytoGrcoMzw6BJWiHe4QUx/UCvLD34yz+swKmT

2fxrap/mZAU+KR1JGvEIwVzFa6dWzCpXIkHthr/ZhnsKK0stl+RD07nKufe78wkE1bwJPwr9r2Zpv4PG

gV1UsF55KtW2LYhiu/nTjGcJcR4gy/AWIhJW8uK+AW
lyHuaQVQRQU5MF5yY9MNffTRvbNwt6Q+KHEeuv5zn2BXuip7H4xPdkhGhZMfl47V9BUL935yt4rfisQk

h8S51To9nGe3QD6wvnoUL+4pm35pod/zb1umCK4zHcWHI9wyH2vIbHBReo9wMsckjOi8Ns8Qrcm5bvBt

RFbSn3YW3ZF9x8IwSfTfHlsf5EotcUEjMise5hwwi3
4Zmd7i8bssNDB4eqSzof8XcxicHIKx2skR6zmK4uWIiMzNV7dsfE6lPfM5dEQpT5lGyaA50aPvkIh2hc

/qd3DLhbz9s672YXmi+QE1Ge8BoNltZ/XxD6DX0YaasHHl6ozGbWtNW1p/er3bP16d9L77h+TVwNLOQo

72EwxtWDgyOnTt9tfwAOMojM3z3XUftil81vdUrEJs
Vc3FhHVu6tAcy7XXTy2D0Bv++Kk/GWfhvw/jy0TUN1FNMa12rUIqsqqzqVqf9xA/kgFhVqqA9uuaP4bn

powlveWjNw0D0dSSd2mFG60lVFgR7fjVJihTGWvYWqBHgvBE0T/TpH7vEg/HK4ck9YaHb9++Ubg0bCO0

8vjnR4bGqe3rBSwG/fDQd7gur4DMz7295avOrdOZwA
9gstrF2ARAZaDL4s/Lb5wbzaK0oyNfR+aPSDsqm7D49B++r0ZUPvrPwVvemyRoelCdsCcPoeeHHv8xdl

0eiXDnJesER1Fz8M1dy9UklXmr8EYQiIRaDyKlP63z6C7s1/kiaVCFsGPO2PIuvGDzzn4lFFwyVecCQq

OlHs0dMFhBPykMvSycFz1uDIIG7DlZP+GvBnYDBQA+
RYYeOAOcAU9iPtQc6LZ1zOzxF0Oc1wdqFlWo3RgvI2as/UyZZa8ZbYo2umBejH2J3F+B7qSRN0KrHe5N

fTJoQ/pGc6PJfCIjq/F7PVDM7lghvp7IZIOEEk1N0dxJuy5h1ShoJ51jKNq+Gi3mSDOlWhsGEZxEpPff

MpvHBZagSysO198rsHaOHucoTEsjoYAzBQdD1cxN9H
faH1Z3Yez0sua232kOdzCS4CDUy6ZXxV7t+6+x8H96KI2di+Tqi/JmifKsPqmVKY9l+AiFxpvAvqSqhb

CnDwYuADeskPHbA2vQY8YK4MLjRS0OBt8w0fEMdKs3DnpJeaE85VC0M1w0e60N49M+PIrupc7gZ5BYlX

JnY2my6J34PU6i0JjHvV9gmPSL+iGzG+pwJ6p4PCvQ
UNFvv70LUW0oM7j/saZKVrtRDy2278U6SqbM5Z+rDxppxMRCIygZdwJl1PPJgl+gJ4Jh4x8nfbqsCMnG

0scGqOffWS8J56HubZ53yldniqbATF3jvn4DK4BCAyJikqEhghtLpR1SF2SmxhM90FKcR9xFYbFNNYND

uUomxCuYoYrxgzPDbF+WEW5E7ZU6SkPjmg99KG8Pw2
Ue7rhaBt9Jr8uzHHgQ7iotwgc3rWJvyBRLc2bXdVnZ/FYtz/ld+Ai4UrOa3hQ4XQilUOhxmaRaN2vhdo

yBiIv2FdtGpfNK4Hnj0BfS99pWb0nMcu1GIOs+9f1cfaTyQagnvop0yiKluU8ZB1y12/DOwsU22Ib7JB

vq23qniOIKewoRqTS8Y0D3XYzI/MUPoJ1wJ+nAD/vU
e4P5RuHBwaBbW25xA9NgoZ4oIGiZnyVcC1kQs6xJ45se2oF+IphCaAafjmxAkCiKFQ282T/mnPe8a9lM

U0BN5hkk0T15PadhsHa9JACLiWbQ8LYwACr9Wl15Pg4XLOMbdCMKQBEcXPIeYGnCvFG+nq71uFNR+m/R

foEkzTYxel26IbmDyo70EKbF1i2M8joRxCgvRHXua0
oPxkfTW2Yz68PEcGSd/JlHm+Mjj0oXoBlb4rUMziC+QNnZLcGIYMHMlJHxj0NQ0QsOYP71E1+KjBp5Xo

ao/OjTJ5QKt7esLRZKBBoqE63A6dQHlU+/8LNoHJRi9IuD04iT1+lWapu2Y8QnvwXz4HB0z0CKSuCIr7

F2vugw+wf2tzbw7zGQlnMlDdQGRu7vxtLxOq3LfoBm
QvhvZwkTM45PnJZdLyUjn8nkHdzXN/e1jwm8/apsHKa8U3MVwIaCU6Gp7NLjlji4Bhp2sU8XvtFt63s6

/F2y5zkiNSrydFCuTgvP9qfiG8xKERdMuvnGAuG80W/oR8vqM3uoTXE8NeKmf+PWKVkqEaAU16suB/pt

7vSVLyX8WFhyIjlxYxwgd+gMaRbKOU75EWjRne6meZ
sIhBV4KkJFUxTE6zAt5Ze8nhWMoFCel+0pEnL7FLQgr9EHtE6CzygrPS0wLX9TJhwgHsLjyhqBZoUgkO

D24ZjtgaR8TjfETaQmZ+fGbN9VhK5huereprzm007bmaEGBuv4GvVuqS78iKup3noHsiQHRrHpNDF5g8

c5CKVclP30+grZj2aMd1qP+zPnEPNanlNEJfTBsQtk
AVl2H8igLkSmzufez0xlK/Q4lcX4m+ZaBgijlsBdke0MhxUVkIqcJ5XAe4dU1H7zVM6PLvHC6RRO3yjX

wonTz994a5P+n6RG9EKV8qNjof4ZN7F/5JGlQUxKM6kPfVTPHcqOmEU6V9GK2H21YhwyO0W69drw0I94

hGbM27iqaPMsO9/17olhz+Le8bvTeE4K+wNomd9L3M
y8yqfcxaG9GxNCTw31RQwZueygORxUeh34cAzAjscnIDhhEP7j1hD9HGqTKSFF4WN7P9eI5Imj27orxx

r8EpiHVpfr9eevll9IMjA0rGU7llqZ9+LvJxGvV+/s9h258aiLXfc+SYmVQPZl3No0PgpA59iVrACz1V

ebvDb5eAKrs1REuIEzJbwJwZ/oU7aeCQi+bIuzVV41
rOYVdtZneuFwjA2BcOnH0JLvJ7rntvaOmkqPaPxmLPseXuq1wRgOGOYRJmgtQjc7HAEv1eLTPARLbC6R

+iSRiOpYoJAYai1pP0tWsJz56SbUFs8q1D2wsIhu7V1jUo+S24j8IeoKu8iVHWEE5J9G/X7Z8F+jcI9B

Tt+5ySG1wAiCntQo+C0mggKnQ6QnIfDM77+NZu2vI5
TkR9nuNq/gBXXU7j7VHhr5V/xnQU45MnEKfoLP7b5oBEZAwIeRdQ/q7emZKLeoPix71eOg/rADOoTLmP

12KnHCGrI74RZHy8YVEC9RqSq7Co/oU2XPRmCA4paFEz1WOZz8IbCyP0cyLDywSf/bldlfvMeLt/7H5J

7h/Sr4f1Px0VQKnkTP9ZHxrheWV0kv2aj6z65Auhzz
PTy8I44/8eMS77RLbLKHa/uyyJMFaBAKC/NC2kTHOY55QRBkWmWi1uFk5WPJ+JU06KyU7yoZ8fDMLf0B

URPNq8OPReP+C+GrFDPNevfprsWL6rW9B6mqcF47gQgCtay1J49QjzFBS/5n2XVzz/PfC7bwsb1D+Bbk

I6x4LeVyJhwgvC2mJdIVyO/0jbJzVw6w5xZT36R4DC
hJB/N3O2iWXg4M37Onz19V5rvPr3bJqOSgFVVUqlG1vaashoVjTJ0zK8enLztFfIGO18rH71R+h9sUTb

62zhCPvrK6yUcX3gKePLhlqNSj4F4DocL5oH9T7Xy0V9CSe7Rp7Y3GrOdYd/IX4DbDvyxz2B2H7E3uUi

JlGjjP6YOdUO9Uw1AtW8KPuYC6M2+DG+k5Bs4LXGdo
hrDvKaGcR4LJrwb8i7UI62Apgf+VdGxfECFTy7aqFrA2Db/27/95WR/8JK3E0svyOEJD3hiVKIkNXF/H

3Rkez+x9J2WPY3nar+V/81wDwnxxN6QadH2lFkRV22Qz/owN+Rnvt9/cl6x/f2J+amjh1UT2Fekj3bx0

ejuJjK9r13UlaQ8r771k+uQ/E1bt37z842UwJZ5PKE
H3QkPc6FizSNgVft9tT/Gti06Ht2W+oN/zoActEYAjRwHGh/aQOvb/NvQLf+PR3OgvyccaJB5OS9Jq54

rJ8L/MWeJaax2Ji/jsCSE9EPSwZd8brnO6UlHlNhEed26ckrP/SWZQtC3ZP1fpjPw7Wgy45YRowgH6T1

o/JVb7lBlhYz9I+B7ka6fWy1BfUtsDppcqsOmy8rff
7ntS/c76VIk2M2t+G4q6POFpAObN2ClgaiGCoGrE5ySDggf/U589FO4iNNPOc4NA5M5RGvoWH91nd0z2

UMYxJzqyq6ctaeBjdb2F3ENUiEjeaB13E5tqgSeWQI9Boxpxkgumpcb7Ydy7rc2+xav56z0g1Jh96P99

JfB9O7ojY3REsJZ3oJgd3r5OHO5GJdh4RjOGnHYs9q
7Y/SQuYku7mZWiuxYZN+M+kRBw2BVYaD21kYHK9scQUD78ak6YpA7C8Do8ugPGe10eSC28tWgspFA6Ha

YXj9ppo1V6qShhMfVfhU6kORB+lfrD8ucASrDrka0I0w72OLeF0C4xtaed/c7+Y9oUKEzztt7y4zgd1r

gX0rwFIP/dz/wwAK7B2wva4Lnylt0h1I9hhk3E/jtW
Fjeit79e/UBZ2IufVmMF5M6QVvzXCcuHaGjdHqyoa+PYrCzy0Gkq33l7WTFfqWMGjDzbWOao0nrAEUjA

OgV8q/BNzWecomDKmxN6XNkkeilvTPClaIuSeQpRH5K0uB/mNr2vKYjFLJOp+NFu/Yg3R7gNHmQL2x8r

lmzW7KON3QJmnqEZY1ntaXFfVdriZR9sTwibLs/bRK
+Ky2VsxQOTDBLWbb1obE+hgUmjpD/ixpy4LlTC/v/x6KM4w9eix9DD8QwCY0VdQg0IRxYvCjZt71R4ay

lUgZgjHF6k0I+puf7rOYEC8bJ+Q5qJfXIlULohHW8HZ2kHgtlCsVZHF6oAIbXy2SgUtmJUrBRmkrWs0R

AfBQJgWWWd9HCoTICxwAvicbyM2vJfjursholNjICS
/+N9TjXM+NJAPhkW+b25zvVUpw81gSdnz1NTxWV/xPtfdRIsNBN8jR/Bc7gGydjD97pr7Jksltxx7Ivw

HX+mtexwbmW/4/Wul+DMD+Ne4VR+jWXVBVDC8yI7mm9UhD5XqpDaqoh3DtfFqjobsHSWKGZDHRrKbywb

TALiUMwW5g3d/5GkihnruUkVzhjrGl7u0RCXIYstn6
jkEWqGp3d18bKG4csnfCSt+sDYYCTEb+CwZQ3pW8l7HhEWuETB9Tfk7fD5a0Z+7N62pCx3GBZ+hr9T7w

kHhgxJs3v2nWnygJbYBlzqCOOd7i+HQ6jG1mMvG1xmZ89q3ZQd3dFxe8JY/R1nq6i2gJmB2D/VA1gkBX

VoToDdiR6rb2X97GpjA46NbSym+zMHNsj7nj70yaE7
cZslUnl5FbRvAzsDwx030r5WwU9pJh71A+97Gm9XsktKaNbK980OI/bcq8t3cHoy1q93gFl/XPUNfjNB

/yqos+imu4I7Tz50mUt3XV73CfhHhlR245iTI2eaxr12Awon23xdgjGuprvP13jF/tSGzPl6QvZyStA/

boTHuA7Ql0zA4anuSOx80BkQMTGe4vXoBWpjDqyYCT
J+3apX0lwoEgmz4SHmpfBpGZ/mZdiHNBFRa20bCcnc/J+0+SR5zNwfsMfnxwQ2x6wwkdz/J+kU4pUT9g

HJNS5LqKSm/LWg+IU/sZvN/UHDd8yINvQwGmZPYcvzvgeZ092X6MgyJn/w5XNqH8xQVVR0JR30cc8/HE

ogtftykb5pIMKPuY+mitTX4nASHD4oy5IK6GoiImIy
vB8zvSBKELZvyiIjyY28xvyUw+or83uNxSD1VC2KDZBeVxyCiu0djiya3QqZQduPdY07PA9ZiRbrDsBc

8VNg7k2QnWsynMulm0tNjCUvvX8/XUty2rHIk0vnXx1YYafu4furDjjhZn78wj8RMnPf6Ks3w0n/lM1V

n2+JPpXaALbD/4qZ4asy3SiDXNm/XoZKqa+/03mFTo
hkwfsehPeayxrpdMk8+wuIa4p3szkiP2y3m7wt66bszufkMgYF9BrBCakaUiTE/ms09aSZmdRWC0r/kD

a83gvavm0W+JVPQJKZYey/f5JZYIb8vhacUfK2b/kcpfnpuInre5U2CGJvIgLRnIdk92ag2B8eRVHaqk

/z2LoR7yQfwi4KKNZioJGOcrJW0FrcY3ozIad3yIA1
G6dBS5GhxWU0A//phhAkhPDsoPXvagAFdXYZbzEG7q5ZHqccIq978dV9ft3B2u4o1RpKfau01354/tx3

+1X/5d7/1tdU+BcGeFesj9Gi9hlgQ/Yz82Ki6SbVrQQiyy1SriU/LRkhnGsSe6xJRPhBasUP+jX2uvQU

MorJZnedF75bssNFet97mt4tRw/IXOuIgsB6v2r/w5
Miq7Lqc5VZ9zOhbL0zuyV7/vmFVndpWHxP52O/SGrF5a2q2NB5T16jsgGkR2z47kkQPq2vc9c6iz/q3x

U+108XNJoF0pI8DKGRNBfPcAFunr7v9KM2I6TK4aD11HlrbFnc1cEijm3fXhgYGlzg54tPbO529alycW

gVv5K81rSKOR13NAKK5WWOkQuf5F4zbUoSk+wPtNyh
qstdJB7E/dis/crLDOy/Qwh5rvCwluf9kLnfV4jbkd+IfFAJ8rrW09JmzV1Z3A+G46xtAjGu9XN/8TdR

FnfxAnzvTgGXzWifUiBRaFNgHjYhLSTjJ+h7qKKQKuN6QdqS4AGBHyub4FfUmIQ5RxFIg7VeRpbDRN/U

Mz6QGjZiKO9nexKm6ItwHY4l0rkMwxKpGRLbsGc+B4
YW/xxivhwK54xerbcBbiCrdO4RcRGgCoKX6HBmaBpxZUBjg19CqdZjWlVpY+53UFTi/r7UGzwrSSN5H0

mn7JA9bNZ1OGGFEAGM/SYHUGBdGnXqAf+vvBs78xv+goUK6tiRj/KKr3LAC2V83M4Xa0RYrgshde7I/e

iXy0WZC3F+l+pLuY4qImw2zDxAehz/l4kXaJhHe73S
77tqHXUrrrWRqJPiQ+n8MGcLy/kBbrB92SS2PLRmP6MAcQL4pd9+JbR9ggw0xWQY5OK3Cze2/ldw9y7I

SVg1k2ZwAGqIM90SaamBO9bkUJct9EgWvaj95T+Xktsz5KO0Blw4y6J0BiXZfz95V5sJomzY5n2PNP53

snWhbK6xUW/aeNjP/X+3ei5n7PskzKNvhx7rWTpRX0
48PZNzzsCdpuQDq7W+68xVWcmoKHRnC83BwppblvYO7MxC8peu4uNNDCcH6y6il9DPiZ27dELc6Kzbrv

KK+Tdgb/9ntwX0u2u2frMPw94fYhsf97lqosVDWPdV1V1BUiJ7mYyYCpZoLiu9lrx+SxQVcfwu0VT49V

JDZBTE8qQQoX/ZjPyEr9wSoI14l5TZpyLTeQnIsmRZ
RxmKDCLZdZPGUYueQ+zSd9ORk+73mhXB7RQbnL7pMeC8W9NjuXMjBJ2E/jYzLGvw0ZKqjbaS49h4schD

URx515lPKTH4f8LcKZNykBN8nMGb6lC76OhqkFph6w7TV1edyMJl9AP0Ts2TZkbHsr5deGCKfCTxr12w

pne+J2Q0OyxPx2OQOrgBE07gJkam301f06LF2s1v5V
yXYB7+vc08t6rfjc4uyIZm5eSxcketu4mBn2JOakTGEKk5q5yjJSFwbucM9vI5orBji1aNgLxsBTkmE3

73CGluUCeaPEWNAzr2gtab2fhSRhU+wZzXEECOPEyR6IJSqXHTuXyIu9ZuUX8IQfeobcph91sD7Aj9l+

qpbJRv6wf3T2gwLU41TP8g+Z+ypSQVgxhlzU5yIhsP
3qC3x1XV92D6Yr4Xi2m8TvKhCxOr87/HKxvzFTCUE/xHgtNl1gb65L4oRNO6VHEvU4UUKzbqMPgPFcC+

3Qdr+lC0zuNjGujLbzR7S0gkLmp3Fv2C3haiEPtzY5js9EfYVW9AsmtAk/Hu9DmyjSy96tVvvYArqA0l

JsrBT9VzMfDJ2XfrHAeG0OWa7G0cH+y6l1EIkOBjcd
Cvg/f97RM7FIXT5+ZxwaHXPhmeL7WVvpiG+56P2OduQ4OYB0k6Qx4hQd1vKzBavBoG1BkFNC3/to21H2

KsOGeCtWvi4GPTpPfbt1UPy9JirsLjsTVqHPcB08zs1O8kZhrybJh2ImODEjeMu0aivpzavnwytUs3v7

OfVA+VdWHbRutV2SAKs77m0oCwJq6de+eS6j2OL5vC
5WoH5ej0p764sA6Z5D2g2M8tmuJdHG55vkT5tJAD7J4rrv2+R5w9x28CGvbafHnl8+j6e8QUW7ZJPeDG

oUZLD4MdwuEZmZ+r0hpDnaf43dvmxnWR7OcqgDq9v4bkfysMzlo/PowE1pvxkg64S/JP25Q0J9aSDxfI

YuRUTZ/Pa3Ac5Z9hQiQBvSUdBbPYQe0zZQA3FQB/W3
3ited+5/aihobHmGqrIK8TK8moXrwgy4AfqX9Lx8cKeHpnKr/YnCLiRxZrhNZbdJ86hYuNHL6n9oMZOU

fSw/4cRlr32soI7/V/dM5APKJJRBmS2yzJownOnj6Ja4G/6BjaeNAD0XdyxGd9SukllvG8QWHReot+Qy

bGtyvJB53rnoCZqqffW3e4zes4Ltv/QgQ0+BTnOcVm
zp1Db0OMk3YG2xtg5QC0Xd8XYAQUpB5PmCZOc+I0Zl1KxEWx/QSMPM3h+Mws3SOUCIwhDe16kAAya0nX

1rHT11ePvzI23RwE+Pwp3VDOwc+MVR7nXgfekpFxgmvPtgyjPsTmEcCtuquX/Yi8HSidmNTj+h6b7ilV

hNntpLx0VirS8eH8DGJK19yC8d9az/x12gLufhwOja
uy5XupkHK9sd5J/ZbIzeXtu0q9S/k+YpBWYBGLkvT5l6W6Vcy+Pk0t+Em7IB04hGPgrdKqn3cPTxGako

qLS5c3F/ShyECsZMWUGEwmawcIXVsNgByLYe5LT8tXt6v2bOy3fYjrjy9iv+Z5ntDo6I6655N695y0W0

tz4cA3LM5/hBEqCa0flbuXSa8gqaMr6z3rtRumoQq/
tANsryfUVdu76avooorlbr6bZ6+SWLdtYl4mtO1cTnjOvNx7AeUEP1AZrayopZhTa0gSplZre4w1XwnV

CQ8v00PSSelp5VsKu/oeQ3ZVy101UXASRiN1lzPNQOSiEUDld2QJUmBgKV6ZaOyrB+lGJevV5lki6EnE

QUOWl3mQpFQrz4VyNw9ApB05k3P2x6pDlETPt/zL4f
zWDTHmkeSWTlZhrtFkhvpBPD7lR29BfvsK5O76qUsv8Bi3lT5he1ugNznU/BM8YIhtgF42WsQSa5GSJE

Tt8rshDw8fxkHB8GraKtpxwzaCtkPPq+2p0oaXIRe4DZfZfOrcNojn8f5rBWzAxwOPyxVZf8JmU7M6ag

W12g8wAp2PNPoY/7rWHKOlh229jap3+zLE46pwyt94
neEmoBJQeZZ1sp9GipGqg5SgLoPO47JCu7bR6HqwntqJOTcNtHfCyRpt7Wc0imD7jqJRLg9quAcTAYeU

SA+kxMNrDs11OH7CKKwS7EGXZBC3kFBSIR2wluGNkyRm4vRCdfTy5cq0kQk4ILn2ii7zMVzXqNTpNcvq

O3e0Q03u5SB7uihHvQXsNFZT3wXfr+ezZXmKmuZH0X
wug0AVc3c/keaSukTukRYSKBjDG3uZhEXLBKD6pU2bXV54zPyWEoZBAv6YHbetNKBGswyjZ5hAJbpBmt

/Pl64f9e5uZ3mHxzTQfF28lAEUPqPTJHh5zfkgvtBt5emmL5a5uVrkOLYsiaooxLVQpPJ5T2pkty3cNH

8Au2LpV08ppuZx3aKvihwxXeNpdeOkZ1NSdfiyh12j
jPBwIOlye7u68Qb7Ysmpb3fpgcJn9TE70yQtXh6jq58jf4HhVS+T25shh9zcbGdUF6g5WaG8SGFRJYdu

k5AD5MpHHsv940Mok62BxKXAYJSJFwT/mxIbmRujlVebvSqGnytXuyo+Dx5iZeh+3OIU4N3uXsKn0NZ0

VzIi9jQVQ0b7YBqgxwhOJJmBhDkIZrgyUA3luwmOI/
zIwYouBLCCA4c2KNM9dNYedWuq0uzvoN0bHDD8QKRpHd1vyAYLX66IO1PASmAW2e2VVhb1fXzCxq0oaD

xpjaNBF4UMsB1nef9aDI8588r3sp3Fzl6eNHDF1VG1O6c+oGVaD58/O6o76fwyAz4HEcV6In/jBdkrub

bcXqaVY6shCR0XQr7lev5D2PY2+AQWBgSGyI0yQP6c
j//wCM1kzFz1GKxbuZddAtxg/DRH6syghylltCmi6onaK6idBODneZDyjw4AUzHUXGSPzfoqb0wJ0NMv

T/gXZgP3lfRbeKOMQDm0IP+nUp6ct5bd35fWVtoLh3RIiQAaPoDrv0vn+4wi636lN7RywmPwvQsJkkoe

tpZv9UrVnvfOZt1sygcQ28zVsdSESQ8p8b23eHGv3v
ABieT36GNU9HCSmiQyTh8p3GJv7x2rpSo0O8ANQBmrQK8Y6eex1VOgGpRBNgyGx0/XEH0EUq3EBPjDz8

VFrOyc2YsqHFLaWRPqCrJILc6xUmWIfchCg0NvMa8ccr2j1QACw0/fD7XGSKjw8RBx0SqGSWjrC7xtfj

jTUiucrxOZfpC9cWXsEzKtPSg78fFKCVlHgRWQpHSr
PWCfKQoiAwoWbNdZrfuDwZuLPJqjCKq0jmbYaYsHwyfP1MDcX5s7yx+CAdzQKl8gMWEW+l3leMQ6ys4u

iODXBFmISt3gadYRAdOPmWiluFnYNWzw8LUZGFYsBLlt34Au+uqSmM/hWvok0SzwqRe4EwxPlKFlnmFd

fxif9eEXSjYNuKFLxsrRoEV5xgHb8MuRcNVvg85Cgd
tuhqc9CG+K/4X1R/V3+QiOKDtZbYZw1liiTXJ0/zpLQi4EDNj5ceuhRwqAJhmUkeHl3Pj7EYqCRB0+iA

E2Lg8CT3cfXkg6bS9OUKUWCQMrLPrEpE+jshKSWdpkTGFh6s8bfXkp7FI9Rgva7R1ypaUv0eTgPm/I+I

Kk2QAe4bXzF4dvLengUlmWan3s8jZN/Uqy/c4M1XXx
+TzGj4E2M0j2gkSfRyEZFYvdUGmQ13zjhSzU6XgbXQhvJ7oBLCpqwAtPH0LgP0enzidHSbRoDP63tO6r

hg70a8CHoFGzt6VuM90iKJT7GUew0gjrA/d3ID2r3V7ny/oB8HYW5hm0QdTTIpVFphweZzMfbOCuW2AY

Euc5XqKLvrKGv9G6HwiIWricJfQpvlyAWZSDOiMHCk
R1dchan2xUC0PKI+En7SFHNpqBXhDZ2XPrhDxEDWamiuLQEeWteXOUhbpEq0D6oTHGZNITQH/qDuC+iH

ctKFzjKWH567MU5NV3ZOL4AnSDt2Daf3Gjg0nO/nz46eBa2FYl0yPKcgjEB9i8pUQhVCH1X+/KO1vYde

lDsmWCraaqa1a4Ab71H7IISAqf29R31bdjwX+Ph5SF
vo9GCfm35jIzU/nDmgZ3bTkMgS532cHt8/Ek+aRpig7WaqlsF4Y72A++/5KZrV/Wv/eOj3tY/GLm9kr+

g6Tm52mJfcd5/oKv56BCjbjQ0A4uXsO/3vDS/ScncDQ8e1z0R7x1VzjiBqWgzsUPV/IXwSG2N8aJzPTP

NDpvVHNQqt7DezzRoZlFsdOmeOIEVV9IqDSKK3AMbM
HCFzSVtnEEyKYAsSQjXswmES0UcBR8MBAMjG5CvHT1NKPCvO3RoEJ9J4E13RFIfEhYNfYBVDSMlAdhNS

Xp7d3WPHQdOLTZSF2wFUXh0x0UVDUtInuUZBVwTkQlHyzHYDZsUfByQdpSLIJo7hkHQN8qLDHcGtXVfF

twMiIPaIlOLmW9ZBtF1MVwR4oDXPp9mnsONRtcQj1+
iLTnL6NYBqDj0ATlG4QTMmAv8PWqMNBPOMoTZKChxD8GQIoDPITgpX3EIMtTDCXwdZ5IAGdnOLVU71zU

N4rVMLmVVipG0acz9c/ZYpC72vNcxl7ouQnizV88Vpo+U1qypa37CikkC9Ip/Yhy7+AoXxdVMNCmVuZH

Y6qrCkeY3EXJ3iw7OnPOyxDRSwLA3tnp1GSQoRGiXr
L1J5vTCmWn2spXHlk0HkqAX5h6PXHaJsH9XxraFMZF1vG7YEVQQXPuhXhxcqkW9JLTUoSTWgEU49UAxf

YW1MTYMQWTtsgXNaRCV9W0Nfz4RodtJgUZExAj2XVFFjZaeyK1KeZqGQAiRlE5JemgQBTn94DIqhRN7c

JSEzNMIfKAY2OXFnZKpeJJ5WeVMgdYSQnlgiKJXlP5
N0ZA0HRQUEYyTpC2XlfqUIuMvjRsOqNcKhSAMYVj3+ORewciWhSjbGBoO5YIQwh8QqFKc7OT1JCXOzZY

fyPZ1Kf894G8X3GxE0uXBrY9bRSm4slEI3iSUyK8Akz4URt467U4GPBBOZHzfPxccxqY3LZMOLa5sYAB

9yfR9MOUChmFn1fX4SHEAxU0cCD6pxhSWwIP1bgjA5
XX4ZBLprs5GraBHqNZAcLiSvV11tUHHazP0sLVlDHAMsgXu2kSabH9W2kAMqCB2wzyFzFB6+CT7OZMQi

Af1bdNZco9JwsAF3k3AsHDbjSBTYAAtyPA1WGrTvCWWqM0haAVCsSoVbUOUcWDvhLbceQEXkAJeuTtVs

FYIqEUagAvNjQFV8JRv0MMCjUAQ3EQtwDhkkSGN0FG
bxEgOoJHS6ROjrBgpbYRH9LLsdLvqmXBW8KKb7VTYkMAXhBYm7YDNnIXKhTMd0HZGcWVNxUAq4MNIlWY

RhFTs2UKQjTIE5IEf9MGAhBBL9BFm2GYUjRRX9QVm6OVTiRLH4DWz2ZLCmSXJ4XSpsIemjETL1HRd4Jm

wrHDK3DDp8WnpqLLE6CHo4MeXzJNV4HNt4LzIoCQGp
VMt5LfxqTMTcPKo5TPEgMIZuFDx4JellADRiJNh5BqBwAOG7JYpcXxvcQUZ6IHa4QJCfPVZ3AMy3MnJg

QRR7OKk0RfUwNYAcXQv2LyzoHPQmOUl7IwwfIXBlDDx0QsJkRNB8IAi2QwhhHMN0CYv2EAFuAAW1EVw8

TuObSPQ7LEn7IqbsDED8LAx6MrfiSVC5YYq1EBAxRV
C7CUa9TQSjXEggEPy4DTCdYCgbTBf4CZIgLFmyJEd5BDNnRQwtANemYqnwHHv9CTl4SLBjWUa2IMc0QX

YzHVl9SUszCgMsCOc2SYy8NBEoLTr2QHeeLgShDWqpXNv5ApVgSOhfAWa4HAOmWKmgIYd3SZTqKHozSC

l0ZNXuXMi0KNmzXxKmXPo9LWx6YAKsPPh9OFvqTwdq
LDb6TOl7SFLgVZd8KYj4TVEbGCboUSv7BoKiILnbFYe1MLJmJMzqEPi4QTSzSPIqFQNpKnUzEWBfMclg

QtUmFQOuDB2MZd2+DTgkneVyErcYIhPvWTIpy2YqUYx6MJ2CUUMlZKhyET7Er584L1D6VzY7qDZuBFaj

YZSgMfRqUDJkjjWiTZLIKTUCM7KrtPZdQ9IzS87fzX
2uE1yjSU47zSI4KNdJQeWgP6Cre3DspgWbflWHt152bkSeCXjoBZBKNH5LUXCfHQ1Bdxnim1YiIAT4LG

PcMs6TJb1IVaRuCD9rsy0CYrMhDLStJlvEWpwuRpMcEYs8ZKSsIjwoCxw3BYE0QCC3TTPxFNP4QLi2BE

N2BwroKCnsQOIlBcMuIaIkMuv3IES4WWOgKyldZlWs
MOA0MNXlWguhOVM0FEC4UeO3GNJnKOH3NPH5WwM6BXVoLYI7FGY1BxC7VLChNSW8LZO9TMDmYbqjRFa6

TZ1DNVq6NEA5FKQ3OQDnEPPpQOA8BbzpUyX2PDthGlY6SvEsYhL3GRFnFCC1ShknGyWbWOK6QML1MWGc

GaV1OKY9LyC2MtEdVnKmTRl5XGW8DjrqAwu0JMnjCw
M8FcwdRlSoJBslZbN0DnzfMIR9YAD9VlA7VfuaLnMlKS5AWdi3UJC4HTEuTpupZUK5EMX1KsLbYzIjSN

X4VOH7LwR2DIWaZVZ2HKW2YdUlSksrFXG8UBB8ZeDvAaSmZbCvSOUmDHYoUcAfLZMlXMT8EhJ2VZVeNF

L5ZHZ1WbVoSnPrNASsTFL9IVW5VELwBBUtNQwvFvQ5
NOIoLWj2OMEjZNSmQLQdNABeAqXdQqY0QFW5MWS6RQUzAbMtXLl1XZY4LMGvGtFsKEl5AGY0PUEqHrAi

YAm1LJT8UNRyHsLbTEs3ANY9TTNzAuBdEFu1DJS5LEUhWoBiXQf8FKZ8GGPhFbEmXZr7DTX9XZStUxXu

EGg3QQEWKqy7KNC7URLjBvXaJGz2DDI2QKQvSvQkIS
f7RGU1ITTePlVdEDH2ZONwBgMlNKM7EWZ0ZiC2LBRaWXK6ACC0OBJ0MODuEzGiOKiyOeCrEuFlJWZ1NZ

E5RHBmXaSpAbL1JIM3SgY4MVRzYXA9LTTdOvOuEuXjBlPlTD4FTZg2HHPwNsLeClZlFtSeHYBvHrZuBx

HwLKZ0TLboHFG1TtDuMRV1QNQaXEQ4MhvqIzY1VSC5
CyS1ZurkQqI0HQL2UaJzVWOmTWsyVfE9UkuqFxR8TME7CxP0CacgRcl3QDJ9PFMhNcvlIjb5FSddUeB8

YdXuVmm4KR0JWek4VSr1FED6HrukGui1PBV2HTX3EhbvBqYhUGezXtB6AuPeJmKaVYM5XxZ0FmpsFkJw

NPX1QwH2FTZrOWZ3YPJ0QlG9NMPrLEN0EHg6IPI5YK
XvNDY2ISF6BwU2JQLtPHQ2PBN0FPCoQmmmQgk2LSR3TYU5SFWbBLl1JDXzUML6DBK7AwT4HXDsIGR1PR

P5IbG4WJntLgDsCGL3ScT6GGUbMBR1XRY4FrL5AFMzXYN5OLHvDQBaHN8GJV0yg4WmWEkqAnOpHA9tqh

7TFNfFEeQaY3L3vKUtVr0rcNHkl2SeuGF2v0SDFgJh
Q9ZzwiKHMS7zB1WcaJQsQNwjRI2Mm4WqnqSaBZA7S1ZoqRfutZzorJU4UOEsTDDbA8JjvEAvNkAlJLtu

WT9GtLRpvkIhZl3EVPXaRi2owYRWe7sjDgDpDCLmHbLmREF0HFxkAL2GHeOpF9c4HBxmO1WhP5uuFNEh

M8YfrUMbZA8ZYj2IXzXyAV0tbr9HTdOqZWYmIxfBPw
o7FLueFV0NwYLnT7GgtdVlW3AwlDvfDF3YjvFbBUfiLR1HXYYsUl9cyG8UdsfbpX1HbvBhNQlvAs7MtY

7DqeHaGU0jw1OweyzQUmUxI1LtlkY9E0woocTcFJ2NCAJ2V0laqfWxDBPXNtQfZ4liEWVmbtEyASQrAA

AYInGtH1AotjJESHSirrjtlU2zHFZuBZNjMg5PTr2G
OjJrUP8lry4FKdKrPPPqBlxLFopbTzCvHEn6NEPvEcrvDmh3XRO2JNA4RGXtVTY7GQz5CAR0AzxwKSvy

QSDlXsYlMpPqMxv4DVQ7HHWkSbisOjTsAUW8QZUcWfkfZAL0PBU5ZuW3CVQvTMH0EAZ5UlS8CYYpQSU2

NPH9QiA0GXPiDTH5SIL1QANvAfvxSHi4JP6XKRl8DC
M9BCC0OPZzHKSiZDG7AtimAcR8ZRncXiW9WqUzXeN6KOGdFXS8BcqcZcQsQXM4DYO4WOTtTgM2KVE9Vx

H1NnTcWqFtCPy6OWQ8VcybQpf3OAegNqB4VumbJuQoUEugYwQ0PideDDN2SOJ7YiB7QxsxIlYqIW1WXw

j0PHY2YZYyBbytLNJ6GKY0PmYjBrNaTXM4SJL1LuL4
WYKcHRM0LNS6PbBcLbzbQPI2EMV4FuMnQuVzEiOlJBNsVXLlMnSpUUPzTHB2ZoX5PEFoCKB3GQD5PkCf

KdHmLQHfBNZ4JQT7ZNWjVXEkWOkqLaH9MOPsPYj0VYQtZLRjMXUiDCTmCfHyIoF4ZKO6FJB1TYZaJuBx

MIi3TXR3VVPzUjObJSd1MWP0GGMvKvXsOGy8LDE3UW
KxNvUtBPn4WPI9WEFdSrBmBIl1TXQ8DCNsHcDgEDv5CEC6VCIhVsJiLPq7UCQ5NBSrRgMxBBp8CXEWAl

w3WYS1VUYtTpBgWJq8UBE7XVRyUhWgRDc2IFD6BYHuFdShOPN2TVDyYuFoVIL6YRL9VuY0LOHnQOC3LY

M8AQX6PEFfAhKkSZriMsDfDsBnHOQ4SKS1BRFkNwKy
VfO4EPX0BdP1GEJgKUY0KLIwLaIhXdEsBcThZM1FIQt4PFNjOtMkLiNoScPdMDDcAkGvXaDdFAL8SIsn

NTT6KuFkLHA0XONiAVZ9ItddExR4SPK5EpA2XjpcCaB2BKL2TyFhZAMkQTnyMnT8MvehMfJ9LHK2JyY3

HhbzNwn5PEU4UMPaNfmhInj7VUzmFbM3FzShYcd0UR
7GXat7NRt2ENR2HptoKma2YGY3AOA7OjdsMaAsWXksAhB9HeXrKyYpAAB2XkZ4AsbcYgZhMUA0VsQ8NB

HwAJD9BRT6QgP4BYFwPTP6RPr4WYO2UVXeYBK5SJC0MtX9YKCcDAE7NMF4JLPrIvfjPcd7RQG4KMZ6OJ

VtSFz8ETClNIP4DUG9VhZ5CFCsRMF4TRZ8ZgP0MVdh
AgEfJKB7NyA7STXeWCF4VFW0MwG2PJXbIJC4EMGwRYPeHN4LRW9iu5UwPUrsDPLgRN6bcx2ELXzMZfXy

B4H1aHJfYn3ddEHjv5GlvCA3v7DYMeHoC1HffrQCGV7dM9JgaOQdMQc6UStkZt6JUCYmCDMaLX95JTee

KE4AICAUNIopiBCzCLT5M5Ttf2QubsAsHFAdNb5QYU
VpQvudT3JzYNPZOjTzQ4YvaeVNTf39DSbcNA0xOKYjVWFrZGI6VMJuZDogOB0TsXXjjNXGodzcDBYnY5

G4QL2KWAGIYs7+ZBtbcnMuAjoYTdG6EQRnw7WaPPi6LD7VUOQoNRjjZL8Xm513Y4K4WaV0uSItEKY4PG

U1wKWhNoJeWFDkhsIfMFTmWVneSDPibDaxI8VkE15o
cE6aE8pmuqIwk7fKnqEhYQoxHi7KSAKoPtipe1RQdVVwFWRjY8tdu9IYsNYpDLT1UC2LNGPiY1umzDgg

JBY6SXOgLl4XQOAmTt7ihJGeb9UdhGO6u0JnLgLyTWVERCv+If3XKN7he6NoLNyqTaXzHL2evd1WW36U

IvCyGX8dlj8JSdPqCT5lye2LILgRBgIsM9Qow2DGPX
OkXm2IVMAbKSE8aP3KxYNvACShQH1wU5NHNM1AIEHsUd9bsDS3YRSvGwIjTTFdTHGNFDhwYGYbO5WuML

T9DISuUi7FJJGuEA5QNzPgWAQvASFSDlElVCTsTnIyKgNpKQRDOQksCSTaB3I3AYL3EWCnDz1+DQogIC

4XL3MpXAW8PIy8FW8+OZuaSH0PgYVFO5HziWGiFDpv
F6TWGT7RSYM7IO7DgGPiHZ7ZgUZGN1SxcAPhIi6xWCHxm6YaIc2jY8RCPRJISGJjKKzfASptQAXnYYg6

E0S4ZBYoE6YCL897sDPgoXf7Af8rO0ATUAeJWmJqODogWLukOJQtAOm2A5D3TODnB0FHM2KjScEovzWz

Y2U+AtPlQHSTIO3KEFGYJWu8B5Z2fBCgE1C9xFjOsK
R8OT5PZV7WvJDeuIBkg60+MvIGLkLiM8PKL8OWLC9IFVu2S2E9zNDkU5E0wTaShYY2ZO9NYO2WwMdlxC

YnKf0oVZvlEVA+Om3XOc4YKoPhKS8usr2HYrviBZMwMyvWByb9N2iwnak6dNIcJdH3Y8K8QvZ3oGVnEG

1EF2Y9hLMkOYB8KJZigTB+Qn6Sj6MiMAYnMIk7U8lr
CJNrOEAfPgPciU78R++6vvjxrDV4N5u5LACYxMQubCsGaqZoQ4pIYUB6k3F2IFs/Xq5INTF0yEy8fFGj

XKHqURu0jV9tbTj1DyUxHY89DUHgCRqyfT6lCin0R4Igt6SeKr3tTw4plQFwCs8GBzVxPOB3jlAeHuSY

YiF2gYvjsmbnXQI2I9a6hGI7Ia48b9fpdoJrn4ZuZi
G6XZfyWCQbAhSbyyKmYIV0wuWwjP1yabJnLt0BAMJrNExgenKuXwGYMk9DEkLqJL19AzcbpS6khPU+DQ

ogICAgICAgICAgICAgICAgICAgICAgICAgICAgICAgICAgICAgICAgICAgICAgICAgICAgICAgICAgIC

AgICAgICAgICAgICAgICAgICAgICAgICAgICAgICAg
ICAgICAgICAgDQogICAgICAgICAgICAgICAgICAgICAgICAgICAgICAgICAgICAgICAgICAgICAgICAg

ICAgICAgICAgICAgICAgICAgICAgICAgICAgICAgICAgICAgICAgICAgICAgICAgICAgDQogICAgICAg

ICAgICAgICAgICAgICAgICAgICAgICAgICAgICAgIC
AgICAgICAgICAgICAgICAgICAgICAgICAgICAgICAgICAgICAgICAgICAgICAgICAgICAgICAgICAgIC

AgDQogICAgICAgICAgICAgICAgICAgICAgICAgICAgICAgICAgICAgICAgICAgICAgICAgICAgICAgIC

AgICAgICAgICAgICAgICAgICAgICAgICAgICAgICAg
ICAgICAgICAgICAgDQogICAgICAgICAgICAgICAgICAgICAgICAgICAgICAgICAgICAgICAgICAgICAg

ICAgICAgICAgICAgICAgICAgICAgICAgICAgICAgICAgICAgICAgICAgICAgICAgICAgICAgDQogICAg

ICAgICAgICAgICAgICAgICAgICAgICAgICAgICAgIC
AgICAgICAgICAgICAgICAgICAgICAgICAgICAgICAgICAgICAgICAgICAgICAgICAgICAgICAgICAgIC

AgICAgDQogICAgICAgICAgICAgICAgICAgICAgICAgICAgICAgICAgICAgICAgICAgICAgICAgICAgIC

AgICAgICAgICAgICAgICAgICAgICAgICAgICAgICAg
ICAgICAgICAgICAgICAgDQogICAgICAgICAgICAgICAgICAgICAgICAgICAgICAgICAgICAgICAgICAg

ICAgICAgICAgICAgICAgICAgICAgICAgICAgICAgICAgICAgICAgICAgICAgICAgICAgICAgICAgDQog

ICAgICAgICAgICAgICAgICAgICAgICAgICAgICAgIC
AgICAgICAgICAgICAgICAgICAgICAgICAgICAgICAgICAgICAgICAgICAgICAgICAgICAgICAgICAgIC

AgICAgICAgDQogICAgICAgICAgICAgICAgICAgICAgICAgICAgICAgICAgICAgICAgICAgICAgICAgIC

AgICAgICAgICAgICAgICAgICAgICAgICAgICAgICAg
VZZjPLBhRCYmUIMnZORgXJUuAZb1J6owBLJgRPJyBQ7pVQr3Mm8+PBdQGdQfQVQ4iaYhuO2MEC9es9Fd

ZFivKMUbe4PqZGc0ZT8EPEYoCSfeCR0FHWdpmf3YKBErBKNqvHDFp3xyBgHwCJP8IFFyNruxHF3SVBMg

P9jswrZhDMRjRABLHHqeHMGBSPceCHRVWG7CXeNaQ7
TlwC80JLRKSx9+CPbebpUjIhzNYdJ9IZRhh4IyWBa2HU9CXNIaKwrnp2TeOzBzHXMLXXigQU4QRER1DS

CiZRDlHn1FPCQgL111xtEjRL3FNd2QYuUvSP0ooe5DZkWhCNNcOwyLXre8QFweAS9OeILiQUuGaq1ymv

WaquCGm4SbexBexOOkHPIjKCM5KVDeWnKfPEZdTtIm
M2soKW9YSAJ4OSEyXBIeUzAqVIEcSRxzLAOKFEhKHmQzG0Nov6DkCpA2PMZsYzBzBQhrGKMzVxF9SH72

mAmsLS7GBZWqSMVnHK55MPT0UJSuUp3GEo2JMlFfNA7kdc2PAfOxZLNdMuuDXyo6NNjuAC8CnHPaP1El

hRWvj3dQAbWpF2KCKPA2PWZyBl6GAQJjOkMrMJCxAA
rkDG9aKKOzJQCTmJpafvT7AI4APZ8pxzWdOA4ASeKnBc5dPg9MOjTfP4ZmD1HgXCEoIUHZPKryZF7FHR

knOQ1fDX0Rn2UKcBSbeA4zny8CLYOnYLDqIzsbyd2OBxyrW0X4kXbnLVNcYrbgEBNKBQswVK2YHQOrJS

M1LMVzMAKhQABDCoAkX69rOX3MH1Htv64wGbZ0OOYo
XoOnEThkIO49bBtolvNdmMGdnEqoQY8QSk2+DQplbmRvYmoNCnhyZWYNCjAgMzINCjAwMDAwMDAwMDAg

LmQ8KmYwZn3TDTEdSMNeQNFxPjCuBQZkGSLnNMkvXDHeXHR5FDf9EHArOGWbXK7BAuRmJJEnKVm1WaJl

WCVwYWUvce2XCJQuCYWkIVL5XbGkWYWzSTLdEOpyOU
PjARU6HeX2RQTqWCExDH2GLaPsAWEdRSV3BTHsCHYfPNJsjy1RMMLoCKOjZmMdVlOlUQLlKKAyDDlmJC

XfPKQ9YcW6LIIyCUTlRB1JOvOnNIHfTFy8PyMnGALpTGSafi3SGRYdNELwEAl3EJHrCALkMIZvPCjcQJ

TdAZU4CfYlELMhUWBeEK4RBbHkFQOyDGp7XujcQQAw
GPAmnj9CKVSsBIYtRZKzKBJdHTUjJZOwYNqbRIGiXQOxTIX4HWVtSZLuHL9WMxPwVPJcSLXtKaHsFJIr

GHFfle2UAFEdZMElEVY4YOAzMUEyXNHzZOeePKFjGOCcVvb4AKBuQQLlRY4DTsJkXZJhSPH4SWIsOFUn

YYBkem9XSQUfYBW5YoYvUsVaBMZwCWZjUZxmMRVwLD
PbHZNiLHTiMXDpFM9NMeIyOCBiOXJ2YItiEPKnTCYzdj0DMEZpANS4DGOfICLmWFBtKEFeUSlrGSUzAQ

C6JousGDXlILFbUH0HLiUnUEFtPUM0AQMbLNSyRNZfis8OVIOyVVA1WAc3ObKeRFZjJZUwORdnJPAsDN

G0OIn3CZSgJVKkRD9SVdIhTJRaTEIdNsGaDNBdXFXm
wu0WFKWvRAR0TvI2LiKtVYDaZJAxKAflZSTxMAP2ZUQ3NVNxADDtIU5RSiFvXWGlMFivQHdmVXGqNXWw

vi8ISOOfLJE0WEB5ZcGjVJEgKTDaVDf2ccBmgYUzUOw9EW2WN2DggfDbXdKXMh8Sy735DUYrOCHcGu7B

G1twYe8wXGGsZSXQBr4UKYn4TTT4EiJuCIWvXOFjWf
CeWtDwZIS5NlBzGgHoWzO2IXT+YJlsPEofBHU0OYMvGWZ3LBC7VdZlUsCcXBDeTlLhLsXgIa2jQAFDPq

4+RBjduRXjeAybJRGOXuT7XRO7RMjgWNTDRk1W









                    ID                  Date                Data Source

 

                    [id number]          2020 07:43:00 AM EST [facility] ([facility])









          Name      Value     Range     Interpretation Code Description Data Stefani

rce(s) Supporting 

Document(s)

 

          Microalbumin Urine 8.6 mg/L  1.3-20.0                      [facility] ([facility])  

 

          Microalb/Creat Ratio 3.7 ug/mg 0.0-30.0                      [facility] ([facility])  

 

          Urine Creatinine 234.0 mg/dL 30.0-125.0                     [facility] ([facility])  









                    ID                  Date                Data Source

 

                    [id number]          2020 07:43:00 AM EST [facility] ([facility])









          Name      Value     Range     Interpretation Code Description Data Stefani

rce(s) Supporting 

Document(s)

 

           Cholesterol [Mass/volume] in Serum or Plasma 120 mg/dL  131-200      

                    [facility] 

([facility])                   

 

           Triglyceride [Mass/volume] in Serum or Plasma 60 mg/dL         

                     [facility] 

([facility])                   

 

           Cholesterol in HDL [Mass/volume] in Serum or Plasma 48 mg/dL   35-60 

                           [facility] 

([facility])                   

 

                    Cholesterol in LDL [Mass/volume] in Serum or Plasma by calcu

lation 60 CALC             

                                          [facility] ([facility])  









                    ID                  Date                Data Source

 

                    [id number]          2020 07:43:00 AM EST [facility] ([facility])









          Name      Value     Range     Interpretation Code Description Data Stefani

rce(s) Supporting 

Document(s)

 

           Glucose [Mass/volume] in Serum or Plasma 94 mg/dL   74-99            

                [facility] ([facility])                              

 

                                        100-125 mg/dL     PRE-DIABETES/FASTING

>126 mg/dL          DIABETES/FASTING

 

 

          Creatinine 0.9 mg/dL 0.6-1.3                       [facility] ([facility]

[facility])  

 

           Urea nitrogen [Mass/volume] in Serum or Plasma 18 mg/dL   7-18       

                      [facility] 

([facility])                   

 

           Sodium [Moles/volume] in Serum or Plasma 142 meq/L  136-145          

                [facility] ([facility])                             

 

           Chloride [Moles/volume] in Serum or Plasma 104 meq/L           

                  [facility] 

([facility])                   

 

           Calcium [Mass/volume] in Serum or Plasma 9.4 mg/dL  8.5-10.1         

                [facility] 

([facility])                   

 

           Potassium [Moles/volume] in Serum or Plasma 4.5 meq/L  3.5-5.1       

                   [facility] 

([facility])                   

 

           Carbon dioxide, total [Moles/volume] in Serum or Plasma 28 meq/L   21

-32                            

[facility] ([facility])            

 

                          Aspartate aminotransferase [Enzymatic activity/volume]

 in Serum or Plasma 24 U/L

             15-37                                  [facility] ([facility]

)  

 

                    Alanine aminotransferase [Enzymatic activity/volume] in Seru

m or Plasma 35 U/L              

12-78                                           [facility] ([facility])  

 

                    Alkaline phosphatase isoenzyme [Units/volume] in Serum or Pl

asma 76 mg/dL            

                                                [facility] ([facility])  

 

          Total Bilirubin 0.7 mg/dL 0.2-1.0                       [facility] ([facility])  

 

           Proteinase 3 Ab [Units/volume] in Serum 7.0 g/dL   6.4-8.2           

               [facility] ([facility])                              

 

           Albumin [Mass/volume] in Serum or Plasma 4.4 g/dL   3.4-5.0          

                [facility] ([facility]                              

 

          A/G Ratio 1.69 CALC 1.00-1.90                     [facility]  

 

                                        Glomerular filtration rate/1.73 sq M pre

dicted among non-blacks [Volume 

Rate/Area] in Serum or Plasma by Creatinine-based formula (MDRD) Laboratory test

result                                              [facility] ([facility]

)  

 

                                        Glomerular filtration rate/1.73 sq M pre

dicted among blacks [Volume Rate/Area] 

in Serum or Plasma by Creatinine-based formula (MDRD) Laboratory test result    

                  

                                        [facility]  

 

                                        <content>CHRONIC KIDNEY DISEASE STAGING 

PER 

NKF</content><br/><content></content><br/><content>STAGE I & II      GFR >= 60  
     NORMAL TO MILDLY DECREASED</content><br/><content>STAGE III          GFR 
30-59          MODERATELY DECREASED</content><br/><content>STAGE IV           
GFR 15-29         SEVERELY DECREASED</content><br/><content>STAGE V            
GFR <15            VERY LITTLE GFR LEFT</content><br/><content>ESRD             
   GFR <15            ON RRT</content><br/><content></content> 









                    ID                  Date                Data Source

 

                    [id number]          2020 07:43:00 AM EST [facility]









          Name      Value     Range     Interpretation Code Description Data Stefani

rce(s) Supporting 

Document(s)

 

                          Glucose mean value [Mass/volume] in Blood Estimated fr

om glycated hemoglobin 111

 mg/dL                                        [facility]

)  

 

           Hemoglobin A1c/Hemoglobin.total in Blood 5.5 %                       

                [facility]                              

 

                                        Lab Result Notes:

Pre-Diabetes   5.7 - 6.4 %

Diabetes           = or > 6.5%

 







                                        Procedure

 

                                          



                                                                                
                                                                                
                                                                                
                                      



Social History

          



           Code       Duration   Value      Status     Description Data Source(s

)

 

                Alcohol intake  2021 12:00:00 AM EST Current drinker of al

cohol (finding) 

completed                 Current drinker of alcohol (finding) [facility]

 

             Tobacco use and exposure 2021 12:00:00 AM EST Never used   co

mpleted    Never 

used                                    [facility]

 

           Smoking    2021 12:00:00 AM EST Never smoker completed  Never s

[facility]

 

                Alcohol intake  10/13/2020 12:00:00 AM EDT Current drinker of al

cohol (finding) 

completed                 Current drinker of alcohol (finding) [facility]



                                                                                
                                                



Vital Signs

          



                    ID                  Date                Data Source

 

                    UNK                                      









           Name       Value      Range      Interpretation Code Description Data

 Source(s)

 

           Body surface area Derived from formula 2.19 m2                       

   2.19 m2    [facility]

 

           Body height 70 [in_i]                        70 [in_i]  [facility] ([facility])

 

                                        5'10" 

 

           Body weight 225.00 [lb_av]                       225.00 [lb_av] [facility]

T ([facility])

 

           Body mass index (BMI) [Ratio] 32.3 kg/m2                       32.3 k

g/m2 [facility] ([facility])

 

           Ideal body weight 166 [lb_av]                       166 [lb_av] [facility]

T ([facility])

 

           Body weight 102.060 kg                       102.060 kg [facility] ([facility])

 

           Respiratory rate 16 /min                          16 /min    [facility]

 

           Diastolic blood pressure 80 mm[Hg]                        80 mm[Hg]  

[facility]

 

           Heart rate 16 /min                          16 /min    [facility]

 

           Systolic blood pressure 130 mm[Hg]                       130 mm[Hg] [facility]

 

           Body height 177.8 cm                         177.8 cm   [facility]

 

           Body weight 101.606 kg                       101.606 kg [facility]

 

           Body mass index (BMI) [Ratio] 32.14 kg/m2                       32.14

 kg/m2 [facility]

 

           Body surface area Derived from formula 2.19 m2                       

   2.19 m2    [facility] ([facility])

 

           Ideal body weight 166 [lb_av]                       166 [lb_av] [facility]

T ([facility])

 

           Body weight 225.00 [lb_av]                       225.00 [lb_av] [facility]

T ([facility])

 

           Body height 70 [in_i]                        70 [in_i]  [facility] ([facility])

 

                                        5'10" 

 

           Body mass index (BMI) [Ratio] 32.3 kg/m2                       32.3 k

g/m2 [facility] ([facility])

 

           Body weight 102.060 kg                       102.060 kg [facility] ([facility])

 

           Body height 70 [in_i]                        70 [in_i]  [facility] ([facility])

 

                                        5'10" 

 

           Body weight 220.00 [lb_av]                       220.00 [lb_av] [facility]

T ([facility])

 

           Ideal body weight 166 [lb_av]                       166 [lb_av] [facility]

T ([facility])

 

           Body surface area Derived from formula 2.17 m2                       

   2.17 m2    [facility] ([facility])

 

           Body mass index (BMI) [Ratio] 31.6 kg/m2                       31.6 k

g/m2 [facility] ([facility])

 

           [facility] 99.792 [facility]                        99.792 [facility]

 

           Body height 70 [in_i]                        70 [in_i]  [facility]

 

                                        5'10" 

 

           Body weight 222.00 [lb_av]                       222.00 [lb_av] [facility]

T ([facility])

 

           Systolic blood pressure 117 mm[Hg]                       117 mm[Hg] M

[facility] ([facility])

 

           Diastolic blood pressure 84 mm[Hg]                        84 mm[Hg]  

[facility] ([facility])

 

           Heart rate 80 /min                          80 /min    [facility]

 

           Body mass index (BMI) [Ratio] 31.9 kg/m2                       31.9 k

g/m2 [facility]

 

           Body weight 100.699 kg                       100.699 kg [facility]

 

           Body temperature 97.2 [degF]                       97.2 [degF] [facility]

 ([facility])

 

           Body temperature 96.9 [degF]                       96.9 [degF] [facility]

 ([facility] 

)

 

           Body height 68.50 [in_i]                       68.50 [in_i] [facility] ([facility]

 

                                        5'8.50" 

 

           Body weight 213.12 [lb_av]                       213.12 [lb_av] [facility]

T ([facility] 

)

 

           Body mass index (BMI) [Ratio] 31.9 kg/m2                       31.9 k

g/m2 [facility] ([facility]

 

           Body weight 223.00 [lb_av]                       223.00 [lb_av] [facility]

T ([facility])

 

           Systolic blood pressure 122 mm[Hg]                       122 mm[Hg] M

[facility] ([facility])

 

           Diastolic blood pressure 72 mm[Hg]                        72 mm[Hg]  

[facility] ([facility])

 

           Heart rate 68 /min                          68 /min    [facility] ([facility])

 

           Body height 69.50 [in_i]                       69.50 [in_i] [facility] ([facility]

 

                                        5'9.50" 

 

           Body mass index (BMI) [Ratio] 32.5 kg/m2                       32.5 k

g/m2 [facility] ([facility]

 

           Body height 70 [in_i]                        70 [in_i]  MEDENT ([facility])

 

                                        5'10" 

 

           Body weight 210.00 [lb_av]                       210.00 [lb_av] MEDEN

T ([facility])

 

           Body mass index (BMI) [Ratio] 30.1 kg/m2                       30.1 k

g/m2 [facility] ([facility])

 

           Ideal body weight 166 [lb_av]                       166 [lb_av] MEDEN

T ([facility])

 

           Body weight 95.256 kg                        95.256 kg  [facility] ([facility])

 

           Body surface area Derived from formula 2.13 m2                       

   2.13 m2    [facility] ([facility])

 

           Body height 70 [in_i]                        70 [in_i]  [facility] ([facility])

 

                                        5'10" 

 

           Body weight 210.00 [lb_av]                       210.00 [lb_av] MEDEN

T ([facility])

 

           Body mass index (BMI) [Ratio] 30.1 kg/m2                       30.1 k

g/m2 [facility] ([facility])

 

           Ideal body weight 166 [lb_av]                       166 [lb_av] MEDEN

T ([facility])

 

           Body weight 95.256 kg                        95.256 kg  [facility] ([facility])

 

           Body surface area Derived from formula 2.13 m2                       

   2.13 m2    [facility] ([facility])

 

           Oxygen saturation in Arterial blood by Pulse oximetry 99 %           

                  99 %       [facility] 

([facility]

 

           Systolic blood pressure 102 mm[Hg]                       102 mm[Hg] M

EDENT ([facility])

 

           Diastolic blood pressure 72 mm[Hg]                        72 mm[Hg]  

[facility]

 

           Heart rate 72 /min                          72 /min    [facility] ([facility])

 

           Body height 69.50 [in_i]                       69.50 [in_i] [facility] ([facility])

 

                                        5'9.50" 

 

           Body weight 205.00 [lb_av]                       205.00 [lb_av] MEDEN

T ([facility])

 

           Body mass index (BMI) [Ratio] 29.8 kg/m2                       29.8 k

g/m2 [facility] ([facility])









                    ID                  Date                Data Source

 

                    [id number]          2021 12:17:31 PM [facility]









           Name       Value      Range      Interpretation Code Description Data

 Source(s)

 

           WEIGHT RECORDED 210 lb                           210 lb     [facility]

 

           Body height Measured 70 in                            70 in      [facility]



                                                                                
                            



Patient Treatment Plan of Care

          



             Planned Activity Planned Date Details      Description  Data Source

(s)

 

             Omeprazole 20 MG Delayed Release Oral Capsule 2021 12:00:00 A

M [facility]

 

             atorvastatin 40 MG Oral Tablet 2021 12:00:00 AM [facility]

 

             clopidogrel 75 MG Oral Tablet 2021 12:00:00 AM [facility]

 

                                        60 ACTUAT Fluticasone propionate 0.25 MG

/ACTUAT / salmeterol 0.05 MG/ACTUAT Dry 

Powder Inhaler  10/05/2020 12:00:00 AM EDT                                 [facility]

 

             Nitroglycerin 0.4 MG Sublingual Tablet                             

           [facility]

## 2021-11-22 NOTE — CCD
Continuity of Care Document (CCD)

                             Created on: 2021



Gary Foreman JR

External Reference #: [id number]

: 1950

Sex: Male



Demographics





                          Address                   [address]

[place]  [address]

 

                          Home Phone                [phone]

 

                          Preferred Language        Unknown

 

                          Marital Status            Unknown

 

                          Rastafarian Affiliation     Unknown

 

                          Race                      White

 

                          Ethnic Group              Declined to Specify/Unknown





Author





                          Author                    Gary JORDAN MD

 

                          Organization              Unknown

 

                          Address                   [address]

[place]  [address]



 

                          Phone                     [phone]







Care Team Providers





                    Care Team Member Name Role                Phone

 

                    Joce Catalan SHAW   AUTM                [phone]

 

                    Julian Sigala M.D.   AUTM                [phone]







Problems





                    Active Problems     Provider            Date

 

                    Allergic asthma without status asthmaticus BRENDAN Reyes Onset: 

2014

 

                    Basal Cell Carcinoma Skin Lower Limb Including Hip Ravi bradford M.D. Onset: 

2014

 

                    Leukoplakia of oral mucosa Herson Jordan MD     Onset: 2016







Social History





                Type            Date            Description     Comments

 

                Birth Sex                       Unknown          

 

                ETOH Use                        2-3 A Week       

 

                Tobacco Use     Start: Unknown  Patient has never smoked  

 

                Recreational Drug Use                 Denies Drug Use  







Allergies and adverse reactions





             Active Allergies Criticality  Reaction | Severity Comments     Date

 

             Penicillin   Unable to assess criticality hives                    

 2014







Medications





           Active Medications SIG        Qnty       Indications Ordering Provide

r Date

 

                          Advair Diskus                     250-50mcg/Dose Aeros

ol                   1 

puff bid. rinse mouth after using 60units                         Unknown       

  

 

                    Atorvastatin Calcium                     40mg Tablets       

            1 po qd             

                                        Unknown             

 

             Clopidogrel Bisulfate                     75mg Tablets             

                                             

Unknown                                 

 

                    Aspirin                     81mg Tablets                   1

 by mouth every day 

                                        Unknown             

 

                          Proair HFA                     108(90Base) mcg/Act Aer

osol                   2 

puffs four times a day as needed                                 Unknown        

 

 

           Nitroglycerin                                  Unknown    

 

                          Omeprazole                     20mg Capsules DR       

            Take One 

Capsule By Mouth Daily                                 Unknown         

0

 

                          Nystatin                     506922Dtgs/ML Suspension 

                  5ml 

swish in the mouth several minuts before swallowing four times a day for 7 days 

                                        Unknown             







Immunizations





                                        Description

 

                                        No Information Available







Vital Signs





                Date            Vital           Result          Comment

 

                2021  7:06am Height          70 inches       5'10"

 

                    Weight              225.00 lb            

 

                    BMI (Body Mass Index) 32.3 kg/m2           

 

                    Ideal Body Weight   166 lb               

 

                    Weight              102.060 kg           

 

                    BSA (Body Surface Area) 2.19 m2              

 

                10/27/2021  7:10am Height          70 inches       5'10"

 

                    Weight              225.00 lb            

 

                    BMI (Body Mass Index) 32.3 kg/m2           

 

                    Ideal Body Weight   166 lb               

 

                    Weight              102.060 kg           

 

                    BSA (Body Surface Area) 2.19 m2              







Results





        Test    Acquired Date Facility Test    Result  H/L     Range   Note

 

                    Xray                2021          [facility] Radiology [facility]

                                        [address] [place] [address] [id number] [phone] CT Neck Soft Tissue W Contrast <pending>              

           

 

                    BUN & Creatinine ([facility]) 10/28/2021          [facility] Main Lab

                                        [address] [place] [address] [id number] [phone] Blood Urea Nitrogen 16 mg/dL   Normal     7-18       1

 

                    Creatinine With GFR 10/28/2021          [facility] Main [facility]

                                        [address] [place] [address] [id number] [phone] Creatinine For GFR 1.00 mg/dL Normal     0.70-1.30   

 

             Glomerular Filtration Rate > 60.0       Normal       >42          2

 

                    Laboratory test finding 10/27/2021          [facility] Main Lab

                                        [address] [place] [address] [id number] [phone] Pathology Request For Service (SEE NOTE)              

          3







                          1                         note:<nlbl:demographic_chang

ed>



 

                          2                         Units are mL/min/1.73 m2



Chronic Kidney Disease Staging per NKF:



Stage I & II   GFR >=60       Normal to Mildly Decreased

Stage III      GFR 30-59      Moderately Decreased

Stage IV       GFR 15-29      Severely Decreased

Stage V        GFR <15        Very Little GFR Left

ESRD           GFR <15 on RRT



 

                          3                         FINAL DIAGNOSIS



Left lateral oral tongue, lesion, biopsy:

Hyperkeratotic squamous mucosa with moderate squamous atypia.

The case was also sent to [facility] for consultation, see report

[id number] scanned in EMR under pathology module.



                                        2021 - 1536



CLINICAL DIAGNOSIS



Lesion

                                        10/28/2021 - 1054



GROSS DIAGNOSIS



Received in formalin labeled "Gary Foreman, designated left lateral

oral tongue" is a 0.4 cm punch of oral mucosa.  All in one.

                                        -

                                        10/28/2021 - 1054



PRELIMINARY DIAGNOSIS



                                        .

                                        2021 - 1536



Signed________ Cora Weinstein MD 10/29/2021 1245 (Prelim)

Signed________ Cora Weinstein MD 2021 1536









Procedures





                Date            Code            Description     Status

 

                10/27/2021      22656           Office/Outpatient Established Mo

d MDM 30-39 Min Completed

 

                10/27/2021      13046           Biopsy Tongue Anterior Two-Third

s Completed

 

                10/13/2021      22466           Office/Outpatient Established Mo

d MDM 30-39 Min Completed

 

                2021      22510           Office/Outpatient Established SF

 MDM 10-19 Min Completed







Medical Devices





                                        Description

 

                                        No Information Available







Encounters





           Type       Date       Location   Provider   Dx         Diagnosis

 

           Office Visit 10/27/2021  7:00a [facility] Herson Jordan MD

 K13.21     

Leukoplakia of oral mucosa, including tongue

 

           Office Visit 10/13/2021  8:15a [facility] Herson Jordan MD

 K13.21     

Leukoplakia of oral mucosa, including tongue

 

           Office Visit 2021  8:15a [facility] Herson Jordan MD

 K13.21     

Leukoplakia of oral mucosa, including tongue







Assessments





                Date            Code            Description     Provider

 

                2021      K13.21          Leukoplakia of oral mucosa, incl

uding tongue Herson Jordan MD

 

                10/27/2021      K13.21          Leukoplakia of oral mucosa, incl

uding tongue Herson Jordan MD

 

                10/13/2021      K13.21          Leukoplakia of oral mucosa, incl

uding tongue Herson Jordan MD

 

                2021      K13.21          Leukoplakia of oral mucosa, incl

uding tongue Herson Jordan MD







Plan of Treatment

Future Appointment(s):* 2021  8:00 am - Herson Jordan MD at [facility]

2021 - Herson Jordan MD* K13.21 Leukoplakia of oral mucosa, including 
  tongue* Follow up:* 2 m









Functional Status





                                        Description

 

                                        No Information Available







Mental Status





                                        Description

 

                                        No Information Available







Referrals





                                        Description

 

                                        No Information Available

## 2021-11-22 NOTE — CCD
Continuity of Care Document (CCD)

                             Created on: 2021



Gary Foreman JR

External Reference #: [id number]

: 1950

Sex: Male



Demographics





                          Address                   [address]

[place]  [address]

 

                          Home Phone                [phone]

 

                          Preferred Language        Unknown

 

                          Marital Status            Unknown

 

                          Caodaism Affiliation     Unknown

 

                          Race                      White

 

                          Ethnic Group              Declined to Specify/Unknown





Author





                          Author                    Gary JORDAN MD

 

                          Organization              Unknown

 

                          Address                   [address]

[place]  [address]



 

                          Phone                     [phone]







Care Team Providers





                    Care Team Member Name Role                Phone

 

                    Joce Catalan SHAW   AUTM                [phone]

 

                    Julian Sigala M.D.   AUTM                [phone]







Problems





                    Active Problems     Provider            Date

 

                    Allergic asthma without status asthmaticus BRENDAN Reyes Onset: 

2014

 

                    Basal Cell Carcinoma Skin Lower Limb Including Hip Ravi bradford M.D. Onset: 

2014

 

                    Leukoplakia of oral mucosa Herson Jordan MD     Onset: 2016







Social History





                Type            Date            Description     Comments

 

                Birth Sex                       Unknown          

 

                ETOH Use                        2-3 A Week       

 

                Tobacco Use     Start: Unknown  Patient has never smoked  

 

                Recreational Drug Use                 Denies Drug Use  







Allergies and adverse reactions





             Active Allergies Criticality  Reaction | Severity Comments     Date

 

             Penicillin   Unable to assess criticality hives                    

 2014







Medications





           Active Medications SIG        Qnty       Indications Ordering Provide

r Date

 

                          Advair Diskus                     250-50mcg/Dose Aeros

ol                   1 

puff bid. rinse mouth after using 60units                         Unknown       

  

 

                    Atorvastatin Calcium                     40mg Tablets       

            1 po qd             

                                        Unknown             

 

             Clopidogrel Bisulfate                     75mg Tablets             

                                             

Unknown                                 

 

                    Aspirin                     81mg Tablets                   1

 by mouth every day 

                                        Unknown             

 

                          Proair HFA                     108(90Base) mcg/Act Aer

osol                   2 

puffs four times a day as needed                                 Unknown        

 

 

           Nitroglycerin                                  Unknown    

 

                          Omeprazole                     20mg Capsules DR       

            Take One 

Capsule By Mouth Daily                                 Unknown         

0

 

                          Nystatin                     658600Rbyr/ML Suspension 

                  5ml 

swish in the mouth several minuts before swallowing four times a day for 7 days 

                                        Unknown             







Immunizations





                                        Description

 

                                        No Information Available







Vital Signs





                Date            Vital           Result          Comment

 

                10/27/2021  7:10am Height          70 inches       5'10"

 

                    Weight              225.00 lb            

 

                    BMI (Body Mass Index) 32.3 kg/m2           

 

                    Ideal Body Weight   166 lb               

 

                    Weight              102.060 kg           

 

                    BSA (Body Surface Area) 2.19 m2              

 

                10/13/2021  8:12am Height          70 inches       5'10"

 

                    Weight              220.00 lb            

 

                    BMI (Body Mass Index) 31.6 kg/m2           

 

                    Ideal Body Weight   166 lb               

 

                    Weight              99.792 kg            

 

                    BSA (Body Surface Area) 2.17 m2              







Results





        Test    Acquired Date Facility Test    Result  H/L     Range   Note

 

                    Xray                2021          [facility] Radiology Dept

                                        [address] [place] [address] [id number] [phone] CT Neck Soft Tissue W Contrast <pending>              

           

 

                    BUN & Creatinine ([facility]) 10/28/2021          [facility] Main Lab

                                        [address] [place] [address] [id number] [phone] Blood Urea Nitrogen 16 mg/dL   Normal     7-18       1

 

                    Creatinine With GFR 10/28/2021          [facility] Main Lab

                                        [address] [place] [address] [id number] [phone] Creatinine For GFR 1.00 mg/dL Normal     0.70-1.30   

 

             Glomerular Filtration Rate > 60.0       Normal       >42          2

 

                    Laboratory test finding 10/27/2021          [facility] Main Lab

                                        [address] [place] [address] [id number] [phone] Pathology Request For Service (SEE NOTE)              

          3







                          1                         note:<nlbl:demographic_chang

ed>



 

                          2                         Units are mL/min/1.73 m2



Chronic Kidney Disease Staging per NKF:



Stage I & II   GFR >=60       Normal to Mildly Decreased

Stage III      GFR 30-59      Moderately Decreased

Stage IV       GFR 15-29      Severely Decreased

Stage V        GFR <15        Very Little GFR Left

ESRD           GFR <15 on RRT



 

                          3                         FINAL DIAGNOSIS



Left lateral oral tongue, lesion, biopsy:

Hyperkeratotic squamous mucosa with moderate squamous atypia.

The case was also sent to [facility] for consultation, see report

[id number] scanned in EMR under pathology module.



                                        2021 - 1536



CLINICAL DIAGNOSIS



Lesion

                                        10/28/2021 - 1054



GROSS DIAGNOSIS



Received in formalin labeled "Gary Foreman, designated left lateral

oral tongue" is a 0.4 cm punch of oral mucosa.  All in one.

                                        -

                                        10/28/2021 - 1054



PRELIMINARY DIAGNOSIS



                                        .

                                        2021 - 1536



Signed________ Cora Weinstein MD 10/29/2021 1245 (Prelim)

Signed________ Cora Weinstein MD 2021 1536









Procedures





                Date            Code            Description     Status

 

                10/27/2021      07093           Office/Outpatient Established Mo

d MDM 30-39 Min Completed

 

                10/27/2021      61733           Biopsy Tongue Anterior Two-Third

s Completed

 

                10/13/2021      59305           Office/Outpatient Established Mo

d MDM 30-39 Min Completed

 

                2021      42448           Office/Outpatient Established SF

 MDM 10-19 Min Completed







[facility]





                                        Description

 

                                        No Information Available







Encounters





           Type       Date       Location   Provider   Dx         Diagnosis

 

           Office Visit 10/27/2021  7:00a [facility] Herson Jordan MD

 K13.21     

Leukoplakia of oral mucosa, including tongue

 

           Office Visit 10/13/2021  8:15a [facility] Herson Jordan MD

 K13.21     

Leukoplakia of oral mucosa, including tongue

 

           Office Visit 2021  8:15a [facility] Herson Jordan MD

 K13.21     

Leukoplakia of oral mucosa, including tongue







Assessments





                Date            Code            Description     Provider

 

                10/27/2021      K13.21          Leukoplakia of oral mucosa, incl

uding tongue Herson Jordan MD

 

                10/13/2021      K13.21          Leukoplakia of oral mucosa, incl

uding tongue Herson Jordan MD

 

                2021      K13.21          Leukoplakia of oral mucosa, incl

uding tongue Herson Jordan MD







Plan of Treatment

Future Appointment(s):* 2021  7:00 am - Herson Jordan MD at [facility]

* 2021  8:00 am - Herson Jordan MD at [facility]

10/27/2021 - Herson Jordan MD* K13.21 Leukoplakia of oral mucosa, including 
  tongue* Follow up:* 7 to 10 days









Functional Status





                                        Description

 

                                        No Information Available







Mental Status





                                        Description

 

                                        No Information Available







Referrals





                                        Description

 

                                        No Information Available

## 2021-11-22 NOTE — CCD
Continuity of Care Document (CCD)

                             Created on: 10/19/2021



Gary Foreman JR

External Reference #: [id number]

: 1950

Sex: Male



Demographics





                          Address                   [address]

[place]  [address]

 

                          Home Phone                [phone]

 

                          Preferred Language        Unknown

 

                          Marital Status            Unknown

 

                          Yazidism Affiliation     Unknown

 

                          Race                      White

 

                          Ethnic Group              Declined to Specify/Unknown





Author





                          Author                    Gary JORDAN MD

 

                          Organization              Unknown

 

                          Address                   [address]

[place]  [address]



 

                          Phone                     [phone]







Care Team Providers





                    Care Team Member Name Role                Phone

 

                    Joce Catalan SHAW   AUTM                [phone]

 

                    Julian Sigala M.D.   AUTM                [phone]







Problems





                    Active Problems     Provider            Date

 

                    Allergic asthma without status asthmaticus BRENDAN Reyes Onset: 

2014

 

                    Basal Cell Carcinoma Skin Lower Limb Including Hip Ravi bradford M.D. Onset: 

2014

 

                    Leukoplakia of oral mucosa Herson Jordan MD     Onset: 2016







Social History





                Type            Date            Description     Comments

 

                Birth Sex                       Unknown          

 

                ETOH Use                        2-3 A Week       

 

                Tobacco Use     Start: Unknown  Patient has never smoked  

 

                Recreational Drug Use                 Denies Drug Use  







Allergies and adverse reactions





             Active Allergies Criticality  Reaction | Severity Comments     Date

 

             Penicillin   Unable to assess criticality hives                    

 2014







Medications





           Active Medications SIG        Qnty       Indications Ordering Provide

r Date

 

                          Advair Diskus                     250-50mcg/Dose Aeros

ol                   1 

puff bid. rinse mouth after using 60units                         Unknown       

  

 

                    Atorvastatin Calcium                     40mg Tablets       

            1 po qd             

                                        Unknown             

 

             Clopidogrel Bisulfate                     75mg Tablets             

                                             

Unknown                                 

 

                    Aspirin                     81mg Tablets                   1

 by mouth every day 

                                        Unknown             

 

                          Proair HFA                     108(90Base) mcg/Act Aer

osol                   2 

puffs four times a day as needed                                 Unknown        

 

 

           Nitroglycerin                                  Unknown    

 

                          Omeprazole                     20mg Capsules DR       

            Take One 

Capsule By Mouth Daily                                 Unknown         

0

 

                          Nystatin                     081571Fmwb/ML Suspension 

                  5ml 

swish in the mouth several minuts before swallowing four times a day for 7 days 

                                        Unknown             







Immunizations





                                        Description

 

                                        No Information Available







Vital Signs





                Date            Vital           Result          Comment

 

                10/13/2021  8:12am Height          70 inches       5'10"

 

                    Weight              220.00 lb            

 

                    BMI (Body Mass Index) 31.6 kg/m2           

 

                    Ideal Body Weight   166 lb               

 

                    Weight              99.792 kg            

 

                    BSA (Body Surface Area) 2.17 m2              

 

                2021  8:21am Height          70 inches       5'10"

 

                    Weight              210.00 lb            

 

                    BMI (Body Mass Index) 30.1 kg/m2           

 

                    Ideal Body Weight   166 lb               

 

                    Weight              95.256 kg            

 

                    BSA (Body Surface Area) 2.13 m2              







Results





                                        Description

 

                                        No Information Available







Procedures





                Date            Code            Description     Status

 

                10/13/2021      22103           Office/Outpatient Established Mo

d MDM 30-39 Min Completed

 

                2021      46235           Office/Outpatient Established SF

 MDM 10-19 Min Completed







Medical Devices





                                        Description

 

                                        No Information Available







Encounters





           Type       Date       Location   Provider   Dx         Diagnosis

 

           Office Visit 10/13/2021  8:15a [facility] Herson Jordan MD

 K13.21     

Leukoplakia of oral mucosa, including tongue

 

           Office Visit 2021  8:15a [facility] Herson Jordan MD

 K13.21     

Leukoplakia of oral mucosa, including tongue







Assessments





                Date            Code            Description     Provider

 

                10/13/2021      K13.21          Leukoplakia of oral mucosa, incl

uding tongue Herson Jordan MD

 

                2021      K13.21          Leukoplakia of oral mucosa, incl

uding tongue Herson Jordan MD







Plan of Treatment

Future Appointment(s):* 10/27/2021  7:00 am - Herson Jordan MD at [facility]

* 2021  8:00 am - Herson Jordan MD at [facility]

10/13/2021 - Herson Jordan MD* K13.21 Leukoplakia of oral mucosa, including 
  tongue* Follow up:* 2 weeks, 30-minute procedure slow









Functional Status





                                        Description

 

                                        No Information Available







Mental Status





                                        Description

 

                                        No Information Available







Referrals





                                        Description

 

                                        No Information Available

## 2022-04-15 ENCOUNTER — HOSPITAL ENCOUNTER (OUTPATIENT)
Dept: HOSPITAL 53 - M ONCR | Age: 72
End: 2022-04-15
Attending: RADIOLOGY
Payer: MEDICARE

## 2022-04-15 DIAGNOSIS — Z88.0: ICD-10-CM

## 2022-04-15 DIAGNOSIS — Z79.82: ICD-10-CM

## 2022-04-15 DIAGNOSIS — C03.1: Primary | ICD-10-CM

## 2022-04-15 PROCEDURE — 31575 DIAGNOSTIC LARYNGOSCOPY: CPT

## 2022-04-19 ENCOUNTER — HOSPITAL ENCOUNTER (OUTPATIENT)
Dept: HOSPITAL 53 - M LAB | Age: 72
End: 2022-04-19
Attending: OTOLARYNGOLOGY
Payer: MEDICARE

## 2022-04-19 DIAGNOSIS — C03.1: Primary | ICD-10-CM

## 2022-04-19 LAB
BUN SERPL-MCNC: 10 MG/DL (ref 7–18)
CREAT SERPL-MCNC: 0.88 MG/DL (ref 0.7–1.3)
GFR SERPL CREATININE-BSD FRML MDRD: > 60 ML/MIN/{1.73_M2} (ref 42–?)

## 2022-04-21 ENCOUNTER — HOSPITAL ENCOUNTER (OUTPATIENT)
Dept: HOSPITAL 53 - M RAD | Age: 72
End: 2022-04-21
Attending: OTOLARYNGOLOGY
Payer: MEDICARE

## 2022-04-21 DIAGNOSIS — C03.1: ICD-10-CM

## 2022-04-21 DIAGNOSIS — G45.1: ICD-10-CM

## 2022-04-21 DIAGNOSIS — M79.9: Primary | ICD-10-CM

## 2022-04-21 PROCEDURE — 70491 CT SOFT TISSUE NECK W/DYE: CPT

## 2022-07-06 ENCOUNTER — HOSPITAL ENCOUNTER (OUTPATIENT)
Dept: HOSPITAL 53 - M ONCR | Age: 72
End: 2022-07-06
Attending: RADIOLOGY
Payer: MEDICARE

## 2022-07-06 DIAGNOSIS — C03.1: Primary | ICD-10-CM

## 2022-07-06 DIAGNOSIS — Z79.51: ICD-10-CM

## 2022-07-06 DIAGNOSIS — Z79.899: ICD-10-CM

## 2022-07-06 DIAGNOSIS — Z88.0: ICD-10-CM

## 2022-07-06 DIAGNOSIS — Z79.82: ICD-10-CM

## 2022-07-06 PROCEDURE — 31575 DIAGNOSTIC LARYNGOSCOPY: CPT

## 2022-07-11 ENCOUNTER — HOSPITAL ENCOUNTER (OUTPATIENT)
Dept: HOSPITAL 53 - M ONCR | Age: 72
LOS: 20 days | End: 2022-07-31
Attending: RADIOLOGY
Payer: MEDICARE

## 2022-07-11 DIAGNOSIS — C03.1: Primary | ICD-10-CM

## 2022-07-12 ENCOUNTER — HOSPITAL ENCOUNTER (OUTPATIENT)
Dept: HOSPITAL 53 - M LAB REF | Age: 72
End: 2022-07-12
Attending: FAMILY MEDICINE
Payer: MEDICARE

## 2022-07-12 DIAGNOSIS — M10.9: Primary | ICD-10-CM

## 2022-07-19 ENCOUNTER — HOSPITAL ENCOUNTER (OUTPATIENT)
Dept: HOSPITAL 53 - M IRPOV | Age: 72
End: 2022-07-19
Attending: RADIOLOGY
Payer: MEDICARE

## 2022-07-19 VITALS — DIASTOLIC BLOOD PRESSURE: 70 MMHG | SYSTOLIC BLOOD PRESSURE: 122 MMHG

## 2022-07-19 VITALS — WEIGHT: 172.18 LBS | BODY MASS INDEX: 24.65 KG/M2 | HEIGHT: 70 IN

## 2022-07-19 DIAGNOSIS — Z80.3: ICD-10-CM

## 2022-07-19 DIAGNOSIS — Z79.82: ICD-10-CM

## 2022-07-19 DIAGNOSIS — C76.0: Primary | ICD-10-CM

## 2022-07-19 DIAGNOSIS — Z79.899: ICD-10-CM

## 2022-07-19 DIAGNOSIS — R13.10: ICD-10-CM

## 2022-07-19 DIAGNOSIS — Z79.01: ICD-10-CM

## 2022-07-19 DIAGNOSIS — Z85.828: ICD-10-CM

## 2022-07-19 DIAGNOSIS — Z85.820: ICD-10-CM

## 2022-07-19 DIAGNOSIS — Z95.5: ICD-10-CM

## 2022-07-19 DIAGNOSIS — Z88.0: ICD-10-CM

## 2022-07-24 ENCOUNTER — HOSPITAL ENCOUNTER (OUTPATIENT)
Dept: HOSPITAL 53 - M LABSMTC | Age: 72
End: 2022-07-24
Attending: ANESTHESIOLOGY
Payer: MEDICARE

## 2022-07-24 DIAGNOSIS — Z11.52: Primary | ICD-10-CM

## 2022-07-24 DIAGNOSIS — Z20.822: ICD-10-CM

## 2022-07-25 ENCOUNTER — HOSPITAL ENCOUNTER (OUTPATIENT)
Dept: HOSPITAL 53 - M IRPRO | Age: 72
End: 2022-07-25
Attending: RADIOLOGY
Payer: MEDICARE

## 2022-07-25 VITALS — DIASTOLIC BLOOD PRESSURE: 78 MMHG | SYSTOLIC BLOOD PRESSURE: 130 MMHG

## 2022-07-25 DIAGNOSIS — C03.1: Primary | ICD-10-CM

## 2022-07-25 PROCEDURE — 99153 MOD SED SAME PHYS/QHP EA: CPT

## 2022-07-25 PROCEDURE — 36561 INSERT TUNNELED CV CATH: CPT

## 2022-07-25 PROCEDURE — 99152 MOD SED SAME PHYS/QHP 5/>YRS: CPT

## 2022-07-28 ENCOUNTER — HOSPITAL ENCOUNTER (OUTPATIENT)
Dept: HOSPITAL 53 - M IRPRO | Age: 72
End: 2022-07-28
Attending: RADIOLOGY
Payer: MEDICARE

## 2022-07-28 VITALS — DIASTOLIC BLOOD PRESSURE: 77 MMHG | SYSTOLIC BLOOD PRESSURE: 117 MMHG

## 2022-07-28 DIAGNOSIS — C03.1: Primary | ICD-10-CM

## 2022-07-28 PROCEDURE — 99152 MOD SED SAME PHYS/QHP 5/>YRS: CPT

## 2022-07-28 PROCEDURE — 49440 PLACE GASTROSTOMY TUBE PERC: CPT

## 2022-07-28 PROCEDURE — 99153 MOD SED SAME PHYS/QHP EA: CPT

## 2022-08-16 ENCOUNTER — HOSPITAL ENCOUNTER (OUTPATIENT)
Dept: HOSPITAL 53 - M ONCR | Age: 72
End: 2022-08-16
Attending: DIETITIAN, REGISTERED
Payer: MEDICARE

## 2022-08-16 ENCOUNTER — HOSPITAL ENCOUNTER (OUTPATIENT)
Dept: HOSPITAL 53 - M IRPOV | Age: 72
End: 2022-08-16
Attending: RADIOLOGY
Payer: MEDICARE

## 2022-08-16 VITALS — BODY MASS INDEX: 23.96 KG/M2 | HEIGHT: 70 IN | WEIGHT: 167.33 LBS

## 2022-08-16 VITALS — SYSTOLIC BLOOD PRESSURE: 132 MMHG | DIASTOLIC BLOOD PRESSURE: 74 MMHG

## 2022-08-16 DIAGNOSIS — Z48.815: ICD-10-CM

## 2022-08-16 DIAGNOSIS — Z01.89: Primary | ICD-10-CM

## 2022-08-16 DIAGNOSIS — Z45.2: ICD-10-CM

## 2022-08-16 DIAGNOSIS — Z48.812: Primary | ICD-10-CM

## 2022-08-16 DIAGNOSIS — Z43.1: ICD-10-CM

## 2022-08-29 ENCOUNTER — HOSPITAL ENCOUNTER (OUTPATIENT)
Dept: HOSPITAL 53 - M ST | Age: 72
LOS: 2 days | End: 2022-08-31
Attending: RADIOLOGY
Payer: MEDICARE

## 2022-08-29 DIAGNOSIS — F80.0: ICD-10-CM

## 2022-08-29 DIAGNOSIS — C03.1: Primary | ICD-10-CM

## 2022-08-31 ENCOUNTER — HOSPITAL ENCOUNTER (OUTPATIENT)
Dept: HOSPITAL 53 - M ONCR | Age: 72
End: 2022-08-31
Attending: RADIOLOGY
Payer: MEDICARE

## 2022-08-31 DIAGNOSIS — C03.1: Primary | ICD-10-CM

## 2022-09-07 ENCOUNTER — HOSPITAL ENCOUNTER (OUTPATIENT)
Dept: HOSPITAL 53 - M ONCR | Age: 72
End: 2022-09-07
Attending: DIETITIAN, REGISTERED
Payer: MEDICARE

## 2022-09-07 DIAGNOSIS — Z01.89: Primary | ICD-10-CM

## 2022-09-16 ENCOUNTER — HOSPITAL ENCOUNTER (OUTPATIENT)
Dept: HOSPITAL 53 - M ONCR | Age: 72
LOS: 14 days | End: 2022-09-30
Attending: RADIOLOGY
Payer: MEDICARE

## 2022-09-16 DIAGNOSIS — C03.1: Primary | ICD-10-CM

## 2022-09-27 ENCOUNTER — HOSPITAL ENCOUNTER (OUTPATIENT)
Dept: HOSPITAL 53 - M ST | Age: 72
LOS: 3 days | End: 2022-09-30
Attending: RADIOLOGY
Payer: MEDICARE

## 2022-09-27 DIAGNOSIS — C03.1: Primary | ICD-10-CM

## 2022-10-07 ENCOUNTER — HOSPITAL ENCOUNTER (OUTPATIENT)
Dept: HOSPITAL 53 - M LAB REF | Age: 72
End: 2022-10-07
Attending: FAMILY MEDICINE
Payer: MEDICARE

## 2022-10-07 DIAGNOSIS — M10.9: Primary | ICD-10-CM

## 2022-10-19 ENCOUNTER — HOSPITAL ENCOUNTER (OUTPATIENT)
Dept: HOSPITAL 53 - M ST | Age: 72
LOS: 12 days | Discharge: HOME | End: 2022-10-31
Attending: RADIOLOGY
Payer: MEDICARE

## 2022-10-19 DIAGNOSIS — C03.1: Primary | ICD-10-CM

## 2022-11-09 ENCOUNTER — HOSPITAL ENCOUNTER (OUTPATIENT)
Dept: HOSPITAL 53 - M LABSMTC | Age: 72
End: 2022-11-09
Attending: ANESTHESIOLOGY
Payer: MEDICARE

## 2022-11-09 DIAGNOSIS — Z01.812: Primary | ICD-10-CM

## 2022-11-09 DIAGNOSIS — Z20.822: ICD-10-CM

## 2022-11-14 ENCOUNTER — HOSPITAL ENCOUNTER (OUTPATIENT)
Dept: HOSPITAL 53 - M OPP | Age: 72
Discharge: HOME | End: 2022-11-14
Attending: INTERNAL MEDICINE
Payer: MEDICARE

## 2022-11-14 VITALS — HEIGHT: 70 IN | WEIGHT: 165 LBS | BODY MASS INDEX: 23.62 KG/M2

## 2022-11-14 VITALS — DIASTOLIC BLOOD PRESSURE: 66 MMHG | SYSTOLIC BLOOD PRESSURE: 111 MMHG

## 2022-11-14 DIAGNOSIS — Z85.830: ICD-10-CM

## 2022-11-14 DIAGNOSIS — D50.9: ICD-10-CM

## 2022-11-14 DIAGNOSIS — J45.909: ICD-10-CM

## 2022-11-14 DIAGNOSIS — Z85.819: ICD-10-CM

## 2022-11-14 DIAGNOSIS — Z79.82: ICD-10-CM

## 2022-11-14 DIAGNOSIS — Z92.21: ICD-10-CM

## 2022-11-14 DIAGNOSIS — Z88.0: ICD-10-CM

## 2022-11-14 DIAGNOSIS — K64.0: ICD-10-CM

## 2022-11-14 DIAGNOSIS — Z79.02: ICD-10-CM

## 2022-11-14 DIAGNOSIS — K57.30: ICD-10-CM

## 2022-11-14 DIAGNOSIS — Z95.5: ICD-10-CM

## 2022-11-14 DIAGNOSIS — Z92.3: ICD-10-CM

## 2022-11-14 DIAGNOSIS — Z79.51: ICD-10-CM

## 2022-11-14 DIAGNOSIS — E78.5: ICD-10-CM

## 2022-11-14 DIAGNOSIS — K55.20: Primary | ICD-10-CM

## 2022-11-14 DIAGNOSIS — I10: ICD-10-CM

## 2022-11-29 ENCOUNTER — HOSPITAL ENCOUNTER (OUTPATIENT)
Dept: HOSPITAL 53 - M ST | Age: 72
LOS: 1 days | End: 2022-11-30
Attending: RADIOLOGY
Payer: MEDICARE

## 2022-11-29 DIAGNOSIS — C03.1: Primary | ICD-10-CM

## 2022-12-01 ENCOUNTER — HOSPITAL ENCOUNTER (OUTPATIENT)
Dept: HOSPITAL 53 - M ONCR | Age: 72
End: 2022-12-01
Attending: RADIOLOGY
Payer: MEDICARE

## 2022-12-01 DIAGNOSIS — C03.1: Primary | ICD-10-CM

## 2022-12-01 LAB
ALBUMIN SERPL BCG-MCNC: 3.4 G/DL (ref 3.2–5.2)
ALP SERPL-CCNC: 114 U/L (ref 46–116)
ALT SERPL W P-5'-P-CCNC: 12 U/L (ref 7–40)
AST SERPL-CCNC: 15 U/L (ref ?–34)
BILIRUB SERPL-MCNC: 0.3 MG/DL (ref 0.3–1.2)
BUN SERPL-MCNC: 25 MG/DL (ref 9–23)
CALCIUM SERPL-MCNC: 9.2 MG/DL (ref 8.3–10.6)
CHLORIDE SERPL-SCNC: 100 MMOL/L (ref 98–107)
CO2 SERPL-SCNC: 28 MMOL/L (ref 20–31)
CREAT SERPL-MCNC: 0.81 MG/DL (ref 0.7–1.3)
GFR SERPL CREATININE-BSD FRML MDRD: > 60 ML/MIN/{1.73_M2} (ref 42–?)
GLUCOSE SERPL-MCNC: 119 MG/DL (ref 74–106)
POTASSIUM SERPL-SCNC: 4 MMOL/L (ref 3.5–5.1)
PROT SERPL-MCNC: 7.2 G/DL (ref 5.7–8.2)
SODIUM SERPL-SCNC: 137 MMOL/L (ref 136–145)

## 2022-12-05 ENCOUNTER — HOSPITAL ENCOUNTER (OUTPATIENT)
Dept: HOSPITAL 53 - M RAD | Age: 72
End: 2022-12-05
Attending: RADIOLOGY
Payer: MEDICARE

## 2022-12-05 DIAGNOSIS — C03.1: Primary | ICD-10-CM

## 2022-12-05 PROCEDURE — 70543 MRI ORBT/FAC/NCK W/O &W/DYE: CPT

## 2022-12-12 ENCOUNTER — HOSPITAL ENCOUNTER (OUTPATIENT)
Dept: HOSPITAL 53 - M PLARAD | Age: 72
End: 2022-12-12
Attending: OTOLARYNGOLOGY
Payer: MEDICARE

## 2022-12-12 DIAGNOSIS — R93.0: ICD-10-CM

## 2022-12-12 DIAGNOSIS — R91.8: ICD-10-CM

## 2022-12-12 DIAGNOSIS — C02.1: Primary | ICD-10-CM

## 2022-12-12 PROCEDURE — 78815 PET IMAGE W/CT SKULL-THIGH: CPT

## 2022-12-13 ENCOUNTER — HOSPITAL ENCOUNTER (OUTPATIENT)
Dept: HOSPITAL 53 - M ST | Age: 72
LOS: 18 days | End: 2022-12-31
Attending: RADIOLOGY
Payer: MEDICARE

## 2022-12-13 DIAGNOSIS — C03.1: Primary | ICD-10-CM

## 2022-12-15 ENCOUNTER — HOSPITAL ENCOUNTER (OUTPATIENT)
Dept: HOSPITAL 53 - M ONCR | Age: 72
End: 2022-12-15
Attending: RADIOLOGY
Payer: MEDICARE

## 2022-12-15 DIAGNOSIS — Z79.51: ICD-10-CM

## 2022-12-15 DIAGNOSIS — Z92.3: ICD-10-CM

## 2022-12-15 DIAGNOSIS — C02.1: ICD-10-CM

## 2022-12-15 DIAGNOSIS — Z79.82: ICD-10-CM

## 2022-12-15 DIAGNOSIS — Z92.21: ICD-10-CM

## 2022-12-15 DIAGNOSIS — H04.201: ICD-10-CM

## 2022-12-15 DIAGNOSIS — C03.1: Primary | ICD-10-CM

## 2022-12-15 DIAGNOSIS — Z88.0: ICD-10-CM

## 2022-12-15 DIAGNOSIS — C79.89: ICD-10-CM

## 2022-12-15 DIAGNOSIS — R22.1: ICD-10-CM

## 2022-12-15 DIAGNOSIS — C04.0: ICD-10-CM

## 2022-12-15 PROCEDURE — 10005 FNA BX W/US GDN 1ST LES: CPT

## 2022-12-15 PROCEDURE — 31575 DIAGNOSTIC LARYNGOSCOPY: CPT

## 2022-12-15 PROCEDURE — 88305 TISSUE EXAM BY PATHOLOGIST: CPT

## 2023-01-24 ENCOUNTER — HOSPITAL ENCOUNTER (OUTPATIENT)
Dept: HOSPITAL 53 - M ONCR | Age: 73
LOS: 7 days | End: 2023-01-31
Attending: RADIOLOGY
Payer: MEDICARE

## 2023-01-24 DIAGNOSIS — C03.1: Primary | ICD-10-CM

## 2023-02-28 ENCOUNTER — HOSPITAL ENCOUNTER (OUTPATIENT)
Dept: HOSPITAL 53 - M ONCR | Age: 73
End: 2023-02-28
Attending: RADIOLOGY
Payer: MEDICARE

## 2023-02-28 DIAGNOSIS — C03.1: Primary | ICD-10-CM

## 2023-03-28 ENCOUNTER — HOSPITAL ENCOUNTER (OUTPATIENT)
Dept: HOSPITAL 53 - M ONCR | Age: 73
LOS: 3 days | End: 2023-03-31
Attending: RADIOLOGY
Payer: MEDICARE

## 2023-03-28 DIAGNOSIS — C03.1: Primary | ICD-10-CM
